# Patient Record
Sex: FEMALE | Race: WHITE | Employment: OTHER | URBAN - METROPOLITAN AREA
[De-identification: names, ages, dates, MRNs, and addresses within clinical notes are randomized per-mention and may not be internally consistent; named-entity substitution may affect disease eponyms.]

---

## 2017-04-06 ENCOUNTER — TELEPHONE (OUTPATIENT)
Dept: INTERNAL MEDICINE | Age: 82
End: 2017-04-06

## 2017-04-26 ENCOUNTER — OFFICE VISIT (OUTPATIENT)
Dept: INTERNAL MEDICINE | Age: 82
End: 2017-04-26

## 2017-04-26 VITALS
HEART RATE: 80 BPM | DIASTOLIC BLOOD PRESSURE: 78 MMHG | WEIGHT: 164 LBS | RESPIRATION RATE: 16 BRPM | SYSTOLIC BLOOD PRESSURE: 128 MMHG | BODY MASS INDEX: 30.99 KG/M2

## 2017-04-26 DIAGNOSIS — E78.00 PURE HYPERCHOLESTEROLEMIA: ICD-10-CM

## 2017-04-26 DIAGNOSIS — R35.0 URINARY FREQUENCY: Primary | ICD-10-CM

## 2017-04-26 DIAGNOSIS — N39.46 MIXED URGE AND STRESS INCONTINENCE: ICD-10-CM

## 2017-04-26 LAB
A/G RATIO: 1.8 (ref 1.1–2.2)
ALBUMIN SERPL-MCNC: 4.4 G/DL (ref 3.4–5)
ALP BLD-CCNC: 99 U/L (ref 40–129)
ALT SERPL-CCNC: 14 U/L (ref 10–40)
ANION GAP SERPL CALCULATED.3IONS-SCNC: 18 MMOL/L (ref 3–16)
AST SERPL-CCNC: 16 U/L (ref 15–37)
BASOPHILS ABSOLUTE: 0 K/UL (ref 0–0.2)
BASOPHILS RELATIVE PERCENT: 0.3 %
BILIRUB SERPL-MCNC: 0.7 MG/DL (ref 0–1)
BILIRUBIN, POC: NORMAL
BLOOD URINE, POC: NORMAL
BUN BLDV-MCNC: 14 MG/DL (ref 7–20)
CALCIUM SERPL-MCNC: 9.4 MG/DL (ref 8.3–10.6)
CHLORIDE BLD-SCNC: 101 MMOL/L (ref 99–110)
CHOLESTEROL, TOTAL: 210 MG/DL (ref 0–199)
CLARITY, POC: NORMAL
CO2: 22 MMOL/L (ref 21–32)
COLOR, POC: YELLOW
CREAT SERPL-MCNC: 0.6 MG/DL (ref 0.6–1.2)
EOSINOPHILS ABSOLUTE: 0.2 K/UL (ref 0–0.6)
EOSINOPHILS RELATIVE PERCENT: 1.9 %
GFR AFRICAN AMERICAN: >60
GFR NON-AFRICAN AMERICAN: >60
GLOBULIN: 2.4 G/DL
GLUCOSE BLD-MCNC: 106 MG/DL (ref 70–99)
GLUCOSE URINE, POC: NORMAL
HCT VFR BLD CALC: 43.6 % (ref 36–48)
HDLC SERPL-MCNC: 47 MG/DL (ref 40–60)
HEMOGLOBIN: 14.7 G/DL (ref 12–16)
KETONES, POC: NORMAL
LDL CHOLESTEROL CALCULATED: 122 MG/DL
LEUKOCYTE EST, POC: NORMAL
LYMPHOCYTES ABSOLUTE: 2.2 K/UL (ref 1–5.1)
LYMPHOCYTES RELATIVE PERCENT: 28 %
MCH RBC QN AUTO: 33.4 PG (ref 26–34)
MCHC RBC AUTO-ENTMCNC: 33.7 G/DL (ref 31–36)
MCV RBC AUTO: 99.2 FL (ref 80–100)
MONOCYTES ABSOLUTE: 0.5 K/UL (ref 0–1.3)
MONOCYTES RELATIVE PERCENT: 6.6 %
NEUTROPHILS ABSOLUTE: 4.9 K/UL (ref 1.7–7.7)
NEUTROPHILS RELATIVE PERCENT: 63.2 %
NITRITE, POC: NORMAL
PDW BLD-RTO: 13.5 % (ref 12.4–15.4)
PH, POC: 6
PLATELET # BLD: 190 K/UL (ref 135–450)
PMV BLD AUTO: 10.6 FL (ref 5–10.5)
POTASSIUM SERPL-SCNC: 4.3 MMOL/L (ref 3.5–5.1)
PROTEIN, POC: NORMAL
RBC # BLD: 4.39 M/UL (ref 4–5.2)
SODIUM BLD-SCNC: 141 MMOL/L (ref 136–145)
SPECIFIC GRAVITY, POC: 1.01
TOTAL PROTEIN: 6.8 G/DL (ref 6.4–8.2)
TRIGL SERPL-MCNC: 205 MG/DL (ref 0–150)
UROBILINOGEN, POC: NORMAL
VLDLC SERPL CALC-MCNC: 41 MG/DL
WBC # BLD: 7.8 K/UL (ref 4–11)

## 2017-04-26 PROCEDURE — 99214 OFFICE O/P EST MOD 30 MIN: CPT | Performed by: INTERNAL MEDICINE

## 2017-04-26 PROCEDURE — 36415 COLL VENOUS BLD VENIPUNCTURE: CPT | Performed by: INTERNAL MEDICINE

## 2017-04-26 PROCEDURE — 81002 URINALYSIS NONAUTO W/O SCOPE: CPT | Performed by: INTERNAL MEDICINE

## 2017-04-26 RX ORDER — OXYBUTYNIN CHLORIDE 5 MG/1
5 TABLET, EXTENDED RELEASE ORAL DAILY
Qty: 30 TABLET | Refills: 3 | Status: SHIPPED | OUTPATIENT
Start: 2017-04-26 | End: 2017-07-24 | Stop reason: SDUPTHER

## 2017-04-26 ASSESSMENT — PATIENT HEALTH QUESTIONNAIRE - PHQ9
SUM OF ALL RESPONSES TO PHQ QUESTIONS 1-9: 0
1. LITTLE INTEREST OR PLEASURE IN DOING THINGS: 0
SUM OF ALL RESPONSES TO PHQ9 QUESTIONS 1 & 2: 0
2. FEELING DOWN, DEPRESSED OR HOPELESS: 0

## 2017-04-26 ASSESSMENT — ENCOUNTER SYMPTOMS
SHORTNESS OF BREATH: 0
GASTROINTESTINAL NEGATIVE: 1

## 2017-04-27 LAB — TSH SERPL DL<=0.05 MIU/L-ACNC: 2.03 UIU/ML (ref 0.27–4.2)

## 2017-06-28 ENCOUNTER — HOSPITAL ENCOUNTER (OUTPATIENT)
Dept: MAMMOGRAPHY | Age: 82
Discharge: HOME | End: 2017-06-28
Attending: INTERNAL MEDICINE | Admitting: INTERNAL MEDICINE

## 2017-06-28 DIAGNOSIS — Z12.31 VISIT FOR SCREENING MAMMOGRAM: ICD-10-CM

## 2017-07-25 RX ORDER — OXYBUTYNIN CHLORIDE 5 MG/1
TABLET, EXTENDED RELEASE ORAL
Qty: 90 TABLET | Refills: 0 | Status: SHIPPED | OUTPATIENT
Start: 2017-07-25 | End: 2017-12-15 | Stop reason: SDUPTHER

## 2017-08-16 ENCOUNTER — TELEPHONE (OUTPATIENT)
Dept: DERMATOLOGY | Age: 82
End: 2017-08-16

## 2017-08-16 ENCOUNTER — OFFICE VISIT (OUTPATIENT)
Dept: INTERNAL MEDICINE | Age: 82
End: 2017-08-16

## 2017-08-16 VITALS — DIASTOLIC BLOOD PRESSURE: 76 MMHG | BODY MASS INDEX: 31.55 KG/M2 | WEIGHT: 167 LBS | SYSTOLIC BLOOD PRESSURE: 122 MMHG

## 2017-08-16 DIAGNOSIS — L57.0 ACTINIC KERATOSIS: ICD-10-CM

## 2017-08-16 DIAGNOSIS — E78.2 MIXED DYSLIPIDEMIA: ICD-10-CM

## 2017-08-16 DIAGNOSIS — N32.81 OVERACTIVE BLADDER: Primary | ICD-10-CM

## 2017-08-16 DIAGNOSIS — M81.6 LOCALIZED OSTEOPOROSIS WITHOUT CURRENT PATHOLOGICAL FRACTURE: ICD-10-CM

## 2017-08-16 DIAGNOSIS — R73.9 HYPERGLYCEMIA: ICD-10-CM

## 2017-08-16 PROCEDURE — 99214 OFFICE O/P EST MOD 30 MIN: CPT | Performed by: HOSPITALIST

## 2017-08-16 ASSESSMENT — PATIENT HEALTH QUESTIONNAIRE - PHQ9
1. LITTLE INTEREST OR PLEASURE IN DOING THINGS: 0
2. FEELING DOWN, DEPRESSED OR HOPELESS: 0
SUM OF ALL RESPONSES TO PHQ QUESTIONS 1-9: 0
SUM OF ALL RESPONSES TO PHQ9 QUESTIONS 1 & 2: 0

## 2017-08-16 ASSESSMENT — ENCOUNTER SYMPTOMS
SPUTUM PRODUCTION: 1
GASTROINTESTINAL NEGATIVE: 1
EYES NEGATIVE: 1

## 2017-08-21 ENCOUNTER — HOSPITAL ENCOUNTER (OUTPATIENT)
Dept: ENDOSCOPY | Age: 82
Discharge: HOME | End: 2017-08-21
Attending: INTERNAL MEDICINE | Admitting: INTERNAL MEDICINE

## 2017-08-21 VITALS
BODY MASS INDEX: 30.73 KG/M2 | SYSTOLIC BLOOD PRESSURE: 151 MMHG | WEIGHT: 167 LBS | HEIGHT: 62 IN | DIASTOLIC BLOOD PRESSURE: 82 MMHG | OXYGEN SATURATION: 98 % | TEMPERATURE: 97.8 F | HEART RATE: 76 BPM | RESPIRATION RATE: 20 BRPM

## 2017-08-30 ENCOUNTER — OFFICE VISIT (OUTPATIENT)
Dept: DERMATOLOGY | Age: 82
End: 2017-08-30

## 2017-08-30 DIAGNOSIS — Z12.83 SCREENING EXAM FOR SKIN CANCER: ICD-10-CM

## 2017-08-30 DIAGNOSIS — L82.1 SEBORRHEIC KERATOSES: Primary | ICD-10-CM

## 2017-08-30 PROCEDURE — 99201 PR OFFICE OUTPATIENT NEW 10 MINUTES: CPT | Performed by: DERMATOLOGY

## 2017-10-11 ENCOUNTER — TELEPHONE (OUTPATIENT)
Dept: INTERNAL MEDICINE | Age: 82
End: 2017-10-11

## 2017-10-11 DIAGNOSIS — R29.898 LEFT LEG WEAKNESS: Primary | ICD-10-CM

## 2017-10-12 NOTE — TELEPHONE ENCOUNTER
Okay to order Physical Therapy per Dr. Robby Nguyen message for patient to call back and let us know where we should order this

## 2017-11-21 ENCOUNTER — NURSE ONLY (OUTPATIENT)
Dept: INTERNAL MEDICINE | Age: 82
End: 2017-11-21

## 2017-11-21 DIAGNOSIS — Z23 NEED FOR INFLUENZA VACCINATION: Primary | ICD-10-CM

## 2017-11-21 PROCEDURE — 90662 IIV NO PRSV INCREASED AG IM: CPT | Performed by: HOSPITALIST

## 2017-11-21 PROCEDURE — 90471 IMMUNIZATION ADMIN: CPT | Performed by: HOSPITALIST

## 2017-11-30 RX ORDER — ERGOCALCIFEROL 1.25 MG/1
CAPSULE ORAL
Qty: 12 CAPSULE | Refills: 3 | Status: SHIPPED | OUTPATIENT
Start: 2017-11-30 | End: 2019-06-06 | Stop reason: SDUPTHER

## 2017-12-15 RX ORDER — OXYBUTYNIN CHLORIDE 5 MG/1
TABLET, EXTENDED RELEASE ORAL
Qty: 90 TABLET | Refills: 0 | Status: SHIPPED | OUTPATIENT
Start: 2017-12-15 | End: 2018-03-14 | Stop reason: DRUGHIGH

## 2018-02-22 ENCOUNTER — TELEPHONE (OUTPATIENT)
Dept: INTERNAL MEDICINE | Age: 83
End: 2018-02-22

## 2018-02-22 RX ORDER — RISEDRONATE SODIUM 35 MG/1
TABLET, FILM COATED ORAL
Qty: 12 TABLET | Refills: 0 | Status: SHIPPED | OUTPATIENT
Start: 2018-02-22 | End: 2018-05-30 | Stop reason: SDUPTHER

## 2018-03-14 ENCOUNTER — OFFICE VISIT (OUTPATIENT)
Dept: INTERNAL MEDICINE | Age: 83
End: 2018-03-14

## 2018-03-14 VITALS
HEIGHT: 62 IN | WEIGHT: 161 LBS | DIASTOLIC BLOOD PRESSURE: 80 MMHG | SYSTOLIC BLOOD PRESSURE: 110 MMHG | BODY MASS INDEX: 29.63 KG/M2

## 2018-03-14 DIAGNOSIS — Z23 NEED FOR SHINGLES VACCINE: ICD-10-CM

## 2018-03-14 DIAGNOSIS — N39.41 URGE INCONTINENCE: Primary | ICD-10-CM

## 2018-03-14 DIAGNOSIS — J30.89 CHRONIC NON-SEASONAL ALLERGIC RHINITIS, UNSPECIFIED TRIGGER: ICD-10-CM

## 2018-03-14 DIAGNOSIS — M81.6 LOCALIZED OSTEOPOROSIS WITHOUT CURRENT PATHOLOGICAL FRACTURE: ICD-10-CM

## 2018-03-14 DIAGNOSIS — L84 CALLUS OF FOOT: ICD-10-CM

## 2018-03-14 LAB
A/G RATIO: 1.7 (ref 1.1–2.2)
ALBUMIN SERPL-MCNC: 4.3 G/DL (ref 3.4–5)
ALP BLD-CCNC: 91 U/L (ref 40–129)
ALT SERPL-CCNC: 15 U/L (ref 10–40)
ANION GAP SERPL CALCULATED.3IONS-SCNC: 14 MMOL/L (ref 3–16)
AST SERPL-CCNC: 21 U/L (ref 15–37)
BASOPHILS ABSOLUTE: 0 K/UL (ref 0–0.2)
BASOPHILS RELATIVE PERCENT: 0.5 %
BILIRUB SERPL-MCNC: 0.6 MG/DL (ref 0–1)
BUN BLDV-MCNC: 17 MG/DL (ref 7–20)
CALCIUM SERPL-MCNC: 9.1 MG/DL (ref 8.3–10.6)
CHLORIDE BLD-SCNC: 104 MMOL/L (ref 99–110)
CHOLESTEROL, TOTAL: 187 MG/DL (ref 0–199)
CO2: 24 MMOL/L (ref 21–32)
CREAT SERPL-MCNC: 0.8 MG/DL (ref 0.6–1.2)
EOSINOPHILS ABSOLUTE: 0.1 K/UL (ref 0–0.6)
EOSINOPHILS RELATIVE PERCENT: 1.1 %
GFR AFRICAN AMERICAN: >60
GFR NON-AFRICAN AMERICAN: >60
GLOBULIN: 2.5 G/DL
GLUCOSE BLD-MCNC: 104 MG/DL (ref 70–99)
HCT VFR BLD CALC: 42.9 % (ref 36–48)
HDLC SERPL-MCNC: 52 MG/DL (ref 40–60)
HEMOGLOBIN: 14.8 G/DL (ref 12–16)
LDL CHOLESTEROL CALCULATED: 107 MG/DL
LYMPHOCYTES ABSOLUTE: 2 K/UL (ref 1–5.1)
LYMPHOCYTES RELATIVE PERCENT: 23 %
MCH RBC QN AUTO: 34 PG (ref 26–34)
MCHC RBC AUTO-ENTMCNC: 34.6 G/DL (ref 31–36)
MCV RBC AUTO: 98.3 FL (ref 80–100)
MONOCYTES ABSOLUTE: 0.6 K/UL (ref 0–1.3)
MONOCYTES RELATIVE PERCENT: 7.6 %
NEUTROPHILS ABSOLUTE: 5.8 K/UL (ref 1.7–7.7)
NEUTROPHILS RELATIVE PERCENT: 67.8 %
PDW BLD-RTO: 13.6 % (ref 12.4–15.4)
PLATELET # BLD: 180 K/UL (ref 135–450)
PMV BLD AUTO: 10.3 FL (ref 5–10.5)
POTASSIUM SERPL-SCNC: 4.6 MMOL/L (ref 3.5–5.1)
RBC # BLD: 4.36 M/UL (ref 4–5.2)
SODIUM BLD-SCNC: 142 MMOL/L (ref 136–145)
TOTAL PROTEIN: 6.8 G/DL (ref 6.4–8.2)
TRIGL SERPL-MCNC: 142 MG/DL (ref 0–150)
VLDLC SERPL CALC-MCNC: 28 MG/DL
WBC # BLD: 8.5 K/UL (ref 4–11)

## 2018-03-14 PROCEDURE — 99214 OFFICE O/P EST MOD 30 MIN: CPT | Performed by: HOSPITALIST

## 2018-03-14 RX ORDER — OXYBUTYNIN CHLORIDE 10 MG/1
TABLET, EXTENDED RELEASE ORAL
Qty: 30 TABLET | Refills: 3 | Status: SHIPPED | OUTPATIENT
Start: 2018-03-14 | End: 2018-03-20 | Stop reason: SDUPTHER

## 2018-03-14 ASSESSMENT — PATIENT HEALTH QUESTIONNAIRE - PHQ9
1. LITTLE INTEREST OR PLEASURE IN DOING THINGS: 0
SUM OF ALL RESPONSES TO PHQ QUESTIONS 1-9: 0
2. FEELING DOWN, DEPRESSED OR HOPELESS: 0
SUM OF ALL RESPONSES TO PHQ9 QUESTIONS 1 & 2: 0

## 2018-03-20 DIAGNOSIS — N39.41 URGE INCONTINENCE: ICD-10-CM

## 2018-03-20 RX ORDER — OXYBUTYNIN CHLORIDE 10 MG/1
TABLET, EXTENDED RELEASE ORAL
Qty: 90 TABLET | Refills: 3 | Status: SHIPPED | OUTPATIENT
Start: 2018-03-20 | End: 2019-04-11 | Stop reason: SDUPTHER

## 2018-05-30 RX ORDER — RISEDRONATE SODIUM 35 MG/1
TABLET, FILM COATED ORAL
Qty: 12 TABLET | Refills: 0 | Status: SHIPPED | OUTPATIENT
Start: 2018-05-30 | End: 2018-08-20 | Stop reason: SDUPTHER

## 2018-07-02 ENCOUNTER — OFFICE VISIT (OUTPATIENT)
Dept: INTERNAL MEDICINE | Age: 83
End: 2018-07-02

## 2018-07-02 VITALS
WEIGHT: 163 LBS | HEIGHT: 62 IN | DIASTOLIC BLOOD PRESSURE: 84 MMHG | SYSTOLIC BLOOD PRESSURE: 130 MMHG | BODY MASS INDEX: 30 KG/M2

## 2018-07-02 DIAGNOSIS — R53.81 PHYSICAL DECONDITIONING: ICD-10-CM

## 2018-07-02 DIAGNOSIS — N39.41 URGE INCONTINENCE OF URINE: ICD-10-CM

## 2018-07-02 DIAGNOSIS — L84 CALLUS OF FOOT: ICD-10-CM

## 2018-07-02 DIAGNOSIS — G31.84 MILD COGNITIVE IMPAIRMENT: ICD-10-CM

## 2018-07-02 DIAGNOSIS — Z00.00 ROUTINE GENERAL MEDICAL EXAMINATION AT A HEALTH CARE FACILITY: Primary | ICD-10-CM

## 2018-07-02 DIAGNOSIS — J30.1 CHRONIC SEASONAL ALLERGIC RHINITIS DUE TO POLLEN: ICD-10-CM

## 2018-07-02 PROCEDURE — G0439 PPPS, SUBSEQ VISIT: HCPCS | Performed by: HOSPITALIST

## 2018-07-02 PROCEDURE — 99214 OFFICE O/P EST MOD 30 MIN: CPT | Performed by: HOSPITALIST

## 2018-07-02 ASSESSMENT — PATIENT HEALTH QUESTIONNAIRE - PHQ9: SUM OF ALL RESPONSES TO PHQ QUESTIONS 1-9: 0

## 2018-07-02 ASSESSMENT — ANXIETY QUESTIONNAIRES: GAD7 TOTAL SCORE: 0

## 2018-07-02 NOTE — PROGRESS NOTES
Medicare Annual Wellness Visit - Subsequent    Name: Magalie Meraz Date: 2018   MRN: [id number] Sex: Female   Age: 80 y.o. Ethnicity: Non-/Non    : 1934 Race: Caren Bravo is here for   Chief Complaint   Patient presents with   [address] for behavioral, psychosocial and functional/safety risks, and cognitive dysfunction are all negative except as indicated below. These results, as well as other patient data from the [address] form, are documented in Flowsheets linked to this Encounter. No Known Allergies    Prior to Visit Medications    Medication Sig Taking? Authorizing Provider   risedronate (ACTONEL) 35 MG tablet TAKE 1 TABLET BY MOUTH EVERY 7 DAYS IN THE MORNING WITH A FULL GLASS OF WATER AND ON AN EMPTY STOMACH. DO NOT EAT OR LIE DOWN FOR 30 MINUTES Yes Loren Lopez MD   oxybutynin (DITROPAN-XL) 10 MG extended release tablet TAKE 1 TABLET BY MOUTH DAILY Yes Loren Lopez MD   vitamin D (ERGOCALCIFEROL) 62863 units CAPS capsule TAKE 1 CAPSULE BY MOUTH ONCE A WEEK Yes Loren Lopez MD   ibuprofen (ADVIL;MOTRIN) 800 MG tablet Take 1 tablet by mouth every 8 hours as needed for Pain.  Yes Brando Nelson MD       Past Medical History:   Diagnosis Date    Acute mastoiditis without complications     Benign neoplasm of colon     Diverticulosis of colon (without mention of hemorrhage)     Internal hemorrhoids without mention of complication     Osteoporosis, unspecified     Other seborrheic keratosis     Pure hypercholesterolemia     Routine general medical examination at a health care facility        Past Surgical History:   Procedure Laterality Date    CATARACT REMOVAL      Bilateral    COLONOSCOPY   , 2006     Dr. Edmundo Kay - internal hemorrhoids , diverticulosis , villotubular adenoma  with  dysplasia     COLONOSCOPY  12    , internal hemorrhoids, diverticulosis, colon polyp, benign, repeat in 5 years    COLONOSCOPY  08/28/2017    Hemmorhoids, diverticulosis and adenamatous colon polyps       Family History   Problem Relation Age of Onset    Cancer Father        CareTeam (Including outside providers/suppliers regularly involved in providing care):   Patient Care Team:  Briana Agudelo MD as PCP - General (Internal Medicine)  Briana Agudelo MD as PCP - S Attributed Provider   Eber Lua MD ( gastroenterology)    Wt Readings from Last 3 Encounters:   07/02/18 163 lb (73.9 kg)   03/14/18 161 lb (73 kg)   08/21/17 167 lb (75.8 kg)     Vitals:    07/02/18 1012   BP: 130/84   Weight: 163 lb (73.9 kg)   Height: 5' 2\" (1.575 m)       General Appearance: alert and oriented to person, place and time, well developed and well- nourished, in no acute distress  Skin: warm and dry, no erythema. + multiple lesions of seborrheic dermatitis on her back. Head: normocephalic and atraumatic  Eyes: pupils equal, round, and reactive to light, extraocular eye movements intact, conjunctivae normal  ENT: tympanic membrane, external ear and ear canal normal bilaterally, nose without deformity, nasal mucosa and turbinates normal without polyps  Neck: supple and non-tender without mass, no thyromegaly or thyroid nodules, no cervical lymphadenopathy  Pulmonary/Chest: clear to auscultation bilaterally- no wheezes, rales or rhonchi, normal air movement, no respiratory distress  Cardiovascular: normal rate, regular rhythm, normal S1 and S2, no murmurs, rubs, clicks, or gallops, distal pulses intact, no carotid bruits  Abdomen: soft, non-tender, non-distended, normal bowel sounds, no masses or organomegaly  Extremities: no cyanosis, clubbing or edema. + multiple calluses of both feet.   Musculoskeletal: normal range of motion, no joint swelling, deformity or tenderness  Neurologic: reflexes normal and symmetric, no cranial nerve deficit, gait, coordination and speech normal    The following problems were reviewed today

## 2018-07-02 NOTE — PATIENT INSTRUCTIONS
Personalized Preventive Plan for Seth Rojo - 7/2/2018  Medicare offers a range of preventive health benefits. Some of the tests and screenings are paid in full while other may be subject to a deductible, co-insurance, and/or copay. Some of these benefits include a comprehensive review of your medical history including lifestyle, illnesses that may run in your family, and various assessments and screenings as appropriate. After reviewing your medical record and screening and assessments performed today your provider may have ordered immunizations, labs, imaging, and/or referrals for you. A list of these orders (if applicable) as well as your Preventive Care list are included within your After Visit Summary for your review. Other Preventive Recommendations:    · A preventive eye exam performed by an eye specialist is recommended every 1-2 years to screen for glaucoma; cataracts, macular degeneration, and other eye disorders. · A preventive dental visit is recommended every 6 months. · Try to get at least 150 minutes of exercise per week or 10,000 steps per day on a pedometer . · Order or download the FREE \"Exercise & Physical Activity: Your Everyday Guide\" from [employer]. Call 2-817.208.8397 or search [employer] online. · You need 9459-1372 mg of calcium and 8424-0333 IU of vitamin D per day. It is possible to meet your calcium requirement with diet alone, but a vitamin D supplement is usually necessary to meet this goal.  · When exposed to the sun, use a sunscreen that protects against both UVA and UVB radiation with an SPF of 30 or greater. Reapply every 2 to 3 hours or after sweating, drying off with a towel, or swimming. · Always wear a seat belt when traveling in a car. Always wear a helmet when riding a bicycle or motorcycle.

## 2018-08-21 RX ORDER — RISEDRONATE SODIUM 35 MG/1
TABLET, FILM COATED ORAL
Qty: 12 TABLET | Refills: 0 | Status: SHIPPED | OUTPATIENT
Start: 2018-08-21 | End: 2019-04-03 | Stop reason: SDUPTHER

## 2018-08-27 ENCOUNTER — HOSPITAL ENCOUNTER (OUTPATIENT)
Dept: MAMMOGRAPHY | Age: 83
Discharge: HOME | End: 2018-08-27
Payer: MEDICARE

## 2018-08-27 DIAGNOSIS — Z12.31 VISIT FOR SCREENING MAMMOGRAM: ICD-10-CM

## 2018-08-27 PROCEDURE — 77067 SCR MAMMO BI INCL CAD: CPT

## 2019-01-02 ENCOUNTER — OFFICE VISIT (OUTPATIENT)
Dept: INTERNAL MEDICINE CLINIC | Age: 84
End: 2019-01-02
Payer: MEDICARE

## 2019-01-02 VITALS
BODY MASS INDEX: 29.44 KG/M2 | WEIGHT: 160 LBS | HEIGHT: 62 IN | SYSTOLIC BLOOD PRESSURE: 120 MMHG | DIASTOLIC BLOOD PRESSURE: 82 MMHG

## 2019-01-02 DIAGNOSIS — L84 FOOT CALLUS: ICD-10-CM

## 2019-01-02 DIAGNOSIS — M81.6 LOCALIZED OSTEOPOROSIS WITHOUT CURRENT PATHOLOGICAL FRACTURE: ICD-10-CM

## 2019-01-02 DIAGNOSIS — N39.41 URGE INCONTINENCE: ICD-10-CM

## 2019-01-02 DIAGNOSIS — L98.9 SKIN LESION OF RIGHT LEG: ICD-10-CM

## 2019-01-02 DIAGNOSIS — E78.00 PURE HYPERCHOLESTEROLEMIA: Primary | ICD-10-CM

## 2019-01-02 PROCEDURE — 3288F FALL RISK ASSESSMENT DOCD: CPT | Performed by: HOSPITALIST

## 2019-01-02 PROCEDURE — 99214 OFFICE O/P EST MOD 30 MIN: CPT | Performed by: HOSPITALIST

## 2019-01-02 PROCEDURE — G8510 SCR DEP NEG, NO PLAN REQD: HCPCS | Performed by: HOSPITALIST

## 2019-01-02 ASSESSMENT — PATIENT HEALTH QUESTIONNAIRE - PHQ9
SUM OF ALL RESPONSES TO PHQ9 QUESTIONS 1 & 2: 0
SUM OF ALL RESPONSES TO PHQ QUESTIONS 1-9: 0
SUM OF ALL RESPONSES TO PHQ QUESTIONS 1-9: 0
2. FEELING DOWN, DEPRESSED OR HOPELESS: 0
1. LITTLE INTEREST OR PLEASURE IN DOING THINGS: 0

## 2019-04-04 RX ORDER — RISEDRONATE SODIUM 35 MG/1
TABLET, FILM COATED ORAL
Qty: 12 TABLET | Refills: 1 | Status: SHIPPED | OUTPATIENT
Start: 2019-04-04 | End: 2019-09-28 | Stop reason: SDUPTHER

## 2019-04-11 DIAGNOSIS — N39.41 URGE INCONTINENCE: ICD-10-CM

## 2019-04-11 RX ORDER — OXYBUTYNIN CHLORIDE 10 MG/1
TABLET, EXTENDED RELEASE ORAL
Qty: 90 TABLET | Refills: 3 | Status: SHIPPED | OUTPATIENT
Start: 2019-04-11 | End: 2020-09-16

## 2019-05-30 RX ORDER — ERGOCALCIFEROL 1.25 MG/1
CAPSULE ORAL
Qty: 12 CAPSULE | Refills: 0 | OUTPATIENT
Start: 2019-05-30

## 2019-06-05 NOTE — TELEPHONE ENCOUNTER
vitamin D (ERGOCALCIFEROL) 05497 units CAPS capsule- requesting refill pls advise      [facility] [place] [address], [facility] [address] [facility] [place], [place] [phone]

## 2019-06-06 RX ORDER — ERGOCALCIFEROL 1.25 MG/1
CAPSULE ORAL
Qty: 12 CAPSULE | Refills: 3 | Status: SHIPPED | OUTPATIENT
Start: 2019-06-06 | End: 2020-01-07 | Stop reason: SDUPTHER

## 2019-07-03 ENCOUNTER — OFFICE VISIT (OUTPATIENT)
Dept: INTERNAL MEDICINE CLINIC | Age: 84
End: 2019-07-03
Payer: MEDICARE

## 2019-07-03 VITALS
HEIGHT: 62 IN | SYSTOLIC BLOOD PRESSURE: 132 MMHG | DIASTOLIC BLOOD PRESSURE: 84 MMHG | WEIGHT: 157 LBS | BODY MASS INDEX: 28.89 KG/M2

## 2019-07-03 DIAGNOSIS — L82.1 OTHER SEBORRHEIC KERATOSIS: ICD-10-CM

## 2019-07-03 DIAGNOSIS — N39.46 MIXED URGE AND STRESS INCONTINENCE: ICD-10-CM

## 2019-07-03 DIAGNOSIS — E78.00 PURE HYPERCHOLESTEROLEMIA: Primary | ICD-10-CM

## 2019-07-03 PROCEDURE — 3288F FALL RISK ASSESSMENT DOCD: CPT | Performed by: HOSPITALIST

## 2019-07-03 PROCEDURE — G8510 SCR DEP NEG, NO PLAN REQD: HCPCS | Performed by: HOSPITALIST

## 2019-07-03 PROCEDURE — 99214 OFFICE O/P EST MOD 30 MIN: CPT | Performed by: HOSPITALIST

## 2019-07-03 ASSESSMENT — PATIENT HEALTH QUESTIONNAIRE - PHQ9
SUM OF ALL RESPONSES TO PHQ QUESTIONS 1-9: 0
SUM OF ALL RESPONSES TO PHQ QUESTIONS 1-9: 0
1. LITTLE INTEREST OR PLEASURE IN DOING THINGS: 0
SUM OF ALL RESPONSES TO PHQ9 QUESTIONS 1 & 2: 0
2. FEELING DOWN, DEPRESSED OR HOPELESS: 0

## 2019-07-03 NOTE — PROGRESS NOTES
MORNING WITH A FULL GLASS OF WATER AND ON AN EMPTY STOMACH. DO NOT EAT OR LIE DOWN FOR 30 MINUTES 12 tablet 1    ibuprofen (ADVIL;MOTRIN) 800 MG tablet Take 1 tablet by mouth every 8 hours as needed for Pain. No current facility-administered medications for this visit. /84   Ht 5' 2\" (1.575 m)   Wt 157 lb (71.2 kg)   BMI 28.72 kg/m²     Physical Exam   Physical Exam   Constitutional: She is oriented to person, place, and time. She appears well-developed and well-nourished. No distress. HENT:   Head: Normocephalic and atraumatic. Right Ear: External ear normal.   Left Ear: External ear normal.   Mouth/Throat: Oropharynx is clear and moist.   Seborrheic Keratosis behind ears and of neck   Eyes: Pupils are equal, round, and reactive to light. EOM are normal.   Cardiovascular: Normal rate, regular rhythm and normal heart sounds. Exam reveals no friction rub. No murmur heard. Pulmonary/Chest: Effort normal. She has no wheezes. She has rales (mild). Abdominal: Soft. Bowel sounds are normal. She exhibits no distension. There is no tenderness. Musculoskeletal: Normal range of motion. She exhibits no edema. Neurological: She is alert and oriented to person, place, and time. Skin:   Multiple lesions of varying size of Seborrheic keratosis of neck and back       Assessment/ plan:     Adam Alexander was seen today for 6 month follow-up. Diagnoses and all orders for this visit:    Pure hypercholesterolemia  -     CBC Auto Differential; Future  -     Comprehensive Metabolic Panel; Future  -     Lipid Panel;  Future  -     Low fat/ low cholesterol diet    Other seborrheic keratosis        -     Doesn't want to follow up with a dermatologist        -     Will monitor    Mixed urge and stress incontinence        -    Continue Oxybutinin     Follow up in 6 months    Raphael Richardson MD

## 2019-07-08 DIAGNOSIS — E78.00 PURE HYPERCHOLESTEROLEMIA: ICD-10-CM

## 2019-07-08 LAB
A/G RATIO: 1.6 (ref 1.1–2.2)
ALBUMIN SERPL-MCNC: 4.1 G/DL (ref 3.4–5)
ALP BLD-CCNC: 89 U/L (ref 40–129)
ALT SERPL-CCNC: 12 U/L (ref 10–40)
ANION GAP SERPL CALCULATED.3IONS-SCNC: 14 MMOL/L (ref 3–16)
AST SERPL-CCNC: 18 U/L (ref 15–37)
BASOPHILS ABSOLUTE: 0 K/UL (ref 0–0.2)
BASOPHILS RELATIVE PERCENT: 0.6 %
BILIRUB SERPL-MCNC: 0.5 MG/DL (ref 0–1)
BUN BLDV-MCNC: 21 MG/DL (ref 7–20)
CALCIUM SERPL-MCNC: 9.7 MG/DL (ref 8.3–10.6)
CHLORIDE BLD-SCNC: 106 MMOL/L (ref 99–110)
CHOLESTEROL, TOTAL: 169 MG/DL (ref 0–199)
CO2: 24 MMOL/L (ref 21–32)
CREAT SERPL-MCNC: 0.7 MG/DL (ref 0.6–1.2)
EOSINOPHILS ABSOLUTE: 0.1 K/UL (ref 0–0.6)
EOSINOPHILS RELATIVE PERCENT: 1.9 %
GFR AFRICAN AMERICAN: >60
GFR NON-AFRICAN AMERICAN: >60
GLOBULIN: 2.5 G/DL
GLUCOSE BLD-MCNC: 92 MG/DL (ref 70–99)
HCT VFR BLD CALC: 41.5 % (ref 36–48)
HDLC SERPL-MCNC: 45 MG/DL (ref 40–60)
HEMOGLOBIN: 14 G/DL (ref 12–16)
LDL CHOLESTEROL CALCULATED: 96 MG/DL
LYMPHOCYTES ABSOLUTE: 1.8 K/UL (ref 1–5.1)
LYMPHOCYTES RELATIVE PERCENT: 28.2 %
MCH RBC QN AUTO: 33.8 PG (ref 26–34)
MCHC RBC AUTO-ENTMCNC: 33.8 G/DL (ref 31–36)
MCV RBC AUTO: 100.1 FL (ref 80–100)
MONOCYTES ABSOLUTE: 0.5 K/UL (ref 0–1.3)
MONOCYTES RELATIVE PERCENT: 7.7 %
NEUTROPHILS ABSOLUTE: 3.9 K/UL (ref 1.7–7.7)
NEUTROPHILS RELATIVE PERCENT: 61.6 %
PDW BLD-RTO: 13.7 % (ref 12.4–15.4)
PLATELET # BLD: 179 K/UL (ref 135–450)
PMV BLD AUTO: 10.6 FL (ref 5–10.5)
POTASSIUM SERPL-SCNC: 4.6 MMOL/L (ref 3.5–5.1)
RBC # BLD: 4.15 M/UL (ref 4–5.2)
SODIUM BLD-SCNC: 144 MMOL/L (ref 136–145)
TOTAL PROTEIN: 6.6 G/DL (ref 6.4–8.2)
TRIGL SERPL-MCNC: 142 MG/DL (ref 0–150)
VLDLC SERPL CALC-MCNC: 28 MG/DL
WBC # BLD: 6.3 K/UL (ref 4–11)

## 2019-09-30 RX ORDER — RISEDRONATE SODIUM 35 MG/1
TABLET, FILM COATED ORAL
Qty: 12 TABLET | Refills: 0 | Status: SHIPPED | OUTPATIENT
Start: 2019-09-30 | End: 2020-01-07 | Stop reason: SDUPTHER

## 2019-12-09 ENCOUNTER — HOSPITAL ENCOUNTER (OUTPATIENT)
Dept: MAMMOGRAPHY | Age: 84
Discharge: HOME | End: 2019-12-09
Payer: MEDICARE

## 2019-12-09 DIAGNOSIS — Z12.31 VISIT FOR SCREENING MAMMOGRAM: ICD-10-CM

## 2019-12-09 PROCEDURE — 77067 SCR MAMMO BI INCL CAD: CPT

## 2019-12-11 ENCOUNTER — HOSPITAL ENCOUNTER (OUTPATIENT)
Dept: ULTRASOUND IMAGING | Age: 84
Discharge: HOME | End: 2019-12-11
Payer: MEDICARE

## 2019-12-11 DIAGNOSIS — R92.8 ABNORMAL MAMMOGRAM: ICD-10-CM

## 2019-12-11 PROCEDURE — 76642 ULTRASOUND BREAST LIMITED: CPT

## 2020-01-07 ENCOUNTER — OFFICE VISIT (OUTPATIENT)
Dept: INTERNAL MEDICINE CLINIC | Age: 85
End: 2020-01-07
Payer: MEDICARE

## 2020-01-07 VITALS
WEIGHT: 161 LBS | SYSTOLIC BLOOD PRESSURE: 120 MMHG | BODY MASS INDEX: 29.63 KG/M2 | HEIGHT: 62 IN | DIASTOLIC BLOOD PRESSURE: 84 MMHG

## 2020-01-07 PROCEDURE — 99214 OFFICE O/P EST MOD 30 MIN: CPT | Performed by: HOSPITALIST

## 2020-01-07 RX ORDER — RISEDRONATE SODIUM 35 MG/1
TABLET, FILM COATED ORAL
Qty: 12 TABLET | Refills: 2 | Status: SHIPPED | OUTPATIENT
Start: 2020-01-07 | End: 2021-07-20 | Stop reason: SDUPTHER

## 2020-01-07 RX ORDER — ERGOCALCIFEROL 1.25 MG/1
CAPSULE ORAL
Qty: 12 CAPSULE | Refills: 3 | Status: SHIPPED | OUTPATIENT
Start: 2020-01-07 | End: 2021-07-20 | Stop reason: SDUPTHER

## 2020-04-13 ENCOUNTER — TELEPHONE (OUTPATIENT)
Dept: INTERNAL MEDICINE CLINIC | Age: 85
End: 2020-04-13

## 2020-04-13 RX ORDER — CIPROFLOXACIN 250 MG/1
250 TABLET, FILM COATED ORAL 2 TIMES DAILY
Qty: 14 TABLET | Refills: 0 | Status: SHIPPED | OUTPATIENT
Start: 2020-04-13 | End: 2020-04-20

## 2020-04-13 NOTE — TELEPHONE ENCOUNTER
Pt states she has burning in bladder. Pt states it just started today. Pt states she has a bladder infection.  Pt would like cipro to be called into pharmacy      Pt  Requesting a call back

## 2020-04-14 LAB — URINE CULTURE, ROUTINE: NORMAL

## 2020-09-16 RX ORDER — OXYBUTYNIN CHLORIDE 10 MG/1
TABLET, EXTENDED RELEASE ORAL
Qty: 90 TABLET | Refills: 0 | Status: SHIPPED | OUTPATIENT
Start: 2020-09-16 | End: 2020-12-14

## 2020-12-14 RX ORDER — OXYBUTYNIN CHLORIDE 10 MG/1
TABLET, EXTENDED RELEASE ORAL
Qty: 90 TABLET | Refills: 0 | Status: SHIPPED | OUTPATIENT
Start: 2020-12-14 | End: 2021-05-07

## 2021-04-22 ENCOUNTER — HOSPITAL ENCOUNTER (OUTPATIENT)
Dept: MAMMOGRAPHY | Age: 86
Discharge: HOME | End: 2021-04-22
Payer: MEDICARE

## 2021-04-22 VITALS — WEIGHT: 161 LBS | HEIGHT: 62 IN | BODY MASS INDEX: 29.63 KG/M2

## 2021-04-22 DIAGNOSIS — Z12.31 VISIT FOR SCREENING MAMMOGRAM: ICD-10-CM

## 2021-04-22 PROCEDURE — 77067 SCR MAMMO BI INCL CAD: CPT

## 2021-05-07 DIAGNOSIS — N39.41 URGE INCONTINENCE: ICD-10-CM

## 2021-05-07 RX ORDER — OXYBUTYNIN CHLORIDE 10 MG/1
TABLET, EXTENDED RELEASE ORAL
Qty: 30 TABLET | Refills: 0 | Status: SHIPPED | OUTPATIENT
Start: 2021-05-07 | End: 2021-06-04

## 2021-06-03 DIAGNOSIS — N39.41 URGE INCONTINENCE: ICD-10-CM

## 2021-06-04 RX ORDER — OXYBUTYNIN CHLORIDE 10 MG/1
TABLET, EXTENDED RELEASE ORAL
Qty: 30 TABLET | Refills: 0 | Status: SHIPPED | OUTPATIENT
Start: 2021-06-04 | End: 2022-07-06 | Stop reason: CLARIF

## 2021-06-24 ENCOUNTER — OFFICE VISIT (OUTPATIENT)
Dept: INTERNAL MEDICINE CLINIC | Age: 86
End: 2021-06-24
Payer: MEDICARE

## 2021-06-24 VITALS
WEIGHT: 157 LBS | HEART RATE: 63 BPM | OXYGEN SATURATION: 96 % | BODY MASS INDEX: 28.72 KG/M2 | DIASTOLIC BLOOD PRESSURE: 72 MMHG | SYSTOLIC BLOOD PRESSURE: 130 MMHG

## 2021-06-24 DIAGNOSIS — E55.9 VITAMIN D DEFICIENCY: ICD-10-CM

## 2021-06-24 DIAGNOSIS — Z13.220 LIPID SCREENING: ICD-10-CM

## 2021-06-24 DIAGNOSIS — R01.1 CARDIAC MURMUR: ICD-10-CM

## 2021-06-24 DIAGNOSIS — B35.3 TINEA PEDIS OF BOTH FEET: ICD-10-CM

## 2021-06-24 DIAGNOSIS — Z00.00 ROUTINE GENERAL MEDICAL EXAMINATION AT A HEALTH CARE FACILITY: Primary | ICD-10-CM

## 2021-06-24 DIAGNOSIS — M81.6 LOCALIZED OSTEOPOROSIS WITHOUT CURRENT PATHOLOGICAL FRACTURE: ICD-10-CM

## 2021-06-24 PROCEDURE — G0439 PPPS, SUBSEQ VISIT: HCPCS | Performed by: HOSPITALIST

## 2021-06-24 SDOH — ECONOMIC STABILITY: FOOD INSECURITY: WITHIN THE PAST 12 MONTHS, THE FOOD YOU BOUGHT JUST DIDN'T LAST AND YOU DIDN'T HAVE MONEY TO GET MORE.: NEVER TRUE

## 2021-06-24 SDOH — ECONOMIC STABILITY: FOOD INSECURITY: WITHIN THE PAST 12 MONTHS, YOU WORRIED THAT YOUR FOOD WOULD RUN OUT BEFORE YOU GOT MONEY TO BUY MORE.: NEVER TRUE

## 2021-06-24 ASSESSMENT — SOCIAL DETERMINANTS OF HEALTH (SDOH): HOW HARD IS IT FOR YOU TO PAY FOR THE VERY BASICS LIKE FOOD, HOUSING, MEDICAL CARE, AND HEATING?: NOT HARD AT ALL

## 2021-06-24 ASSESSMENT — PATIENT HEALTH QUESTIONNAIRE - PHQ9
SUM OF ALL RESPONSES TO PHQ QUESTIONS 1-9: 0
SUM OF ALL RESPONSES TO PHQ QUESTIONS 1-9: 0
SUM OF ALL RESPONSES TO PHQ9 QUESTIONS 1 & 2: 0
2. FEELING DOWN, DEPRESSED OR HOPELESS: 0
1. LITTLE INTEREST OR PLEASURE IN DOING THINGS: 0
SUM OF ALL RESPONSES TO PHQ QUESTIONS 1-9: 0

## 2021-06-24 ASSESSMENT — LIFESTYLE VARIABLES: HOW OFTEN DO YOU HAVE A DRINK CONTAINING ALCOHOL: 0

## 2021-06-24 NOTE — PROGRESS NOTES
09/21/2015    Influenza, High Dose (Fluzone 65 yrs and older) 11/21/2017, 10/02/2018    Influenza, Quadv, IM, PF (6 mo and older Fluzone, Flulaval, Fluarix, and 3 yrs and older Afluria) 10/26/2016    Pneumococcal Conjugate 13-valent ([id number]) 09/21/2015    Pneumococcal Polysaccharide ([id number]) 11/01/2000, 11/15/2011    Td, unspecified formulation 12/07/2005    Tdap (Boostrix, Adacel) 08/28/2012        Health Maintenance   Topic Date Due    Annual Wellness Visit (AWV)  Never done    DTaP/Tdap/Td vaccine (2 - Td or Tdap) 08/28/2022    Flu vaccine  Completed    Shingles Vaccine  Completed    Pneumococcal 65+ years Vaccine  Completed    COVID-19 Vaccine  Completed    Hepatitis A vaccine  Aged Out    Hepatitis B vaccine  Aged Out    Hib vaccine  Aged Out    Meningococcal (ACWY) vaccine  Aged Out     Recommendations for [employer] Due: see orders and patient instructions/AVS.  . Recommended screening schedule for the next 5-10 years is provided to the patient in written form: see Patient Suzanne Chapa was seen today for medicare awv. Diagnoses and all orders for this visit:    Routine general medical examination at a health care facility  -     CBC Auto Differential; Future  -     Comprehensive Metabolic Panel; Future  -     Encouraged regular exercise    Lipid screening  -     Lipid Panel; Future    Vitamin D deficiency  -     Vitamin D 25 Hydroxy;  Future    Cardiac murmur  -     ECHO Complete 2D W Doppler W Color    Tinea pedis of both feet  -     [facility] - Peter Lyles DPM, Podiatry, [place]    Osteoporosis  -     Continue Actonel      Follow up in 6 months    Emre Graves MD

## 2021-07-20 RX ORDER — ERGOCALCIFEROL 1.25 MG/1
CAPSULE ORAL
Qty: 12 CAPSULE | Refills: 3 | Status: SHIPPED | OUTPATIENT
Start: 2021-07-20 | End: 2021-08-17 | Stop reason: SDUPTHER

## 2021-07-20 RX ORDER — RISEDRONATE SODIUM 35 MG/1
TABLET, FILM COATED ORAL
Qty: 12 TABLET | Refills: 2 | Status: SHIPPED | OUTPATIENT
Start: 2021-07-20 | End: 2022-02-17 | Stop reason: SDUPTHER

## 2021-08-16 NOTE — TELEPHONE ENCOUNTER
Patient's  came into office requesting refill for the following medication:      vitamin D (ERGOCALCIFEROL) 1.25 MG (13124 UT) CAPS capsule     Patient's  stated that they never received refill from [facility] so he cancelled the order with them and wants them the medication refilled at the pharmacy below. [facility] [address], [address] [phone]    Pls advise.

## 2021-08-17 RX ORDER — ERGOCALCIFEROL 1.25 MG/1
CAPSULE ORAL
Qty: 12 CAPSULE | Refills: 3 | Status: SHIPPED | OUTPATIENT
Start: 2021-08-17 | End: 2022-07-06 | Stop reason: CLARIF

## 2022-01-04 ENCOUNTER — OFFICE VISIT (OUTPATIENT)
Dept: INTERNAL MEDICINE CLINIC | Age: 87
End: 2022-01-04
Payer: MEDICARE

## 2022-01-04 VITALS
HEIGHT: 62 IN | DIASTOLIC BLOOD PRESSURE: 81 MMHG | BODY MASS INDEX: 28.16 KG/M2 | HEART RATE: 72 BPM | WEIGHT: 153 LBS | SYSTOLIC BLOOD PRESSURE: 140 MMHG

## 2022-01-04 DIAGNOSIS — N39.41 URGE INCONTINENCE: ICD-10-CM

## 2022-01-04 DIAGNOSIS — B35.3 TINEA PEDIS OF BOTH FEET: ICD-10-CM

## 2022-01-04 DIAGNOSIS — R68.89 FORGETFULNESS: ICD-10-CM

## 2022-01-04 DIAGNOSIS — I35.0 MODERATE AORTIC STENOSIS: ICD-10-CM

## 2022-01-04 DIAGNOSIS — E78.5 DYSLIPIDEMIA: ICD-10-CM

## 2022-01-04 DIAGNOSIS — E78.5 DYSLIPIDEMIA: Primary | ICD-10-CM

## 2022-01-04 LAB
A/G RATIO: 1.5 (ref 1.1–2.2)
ALBUMIN SERPL-MCNC: 4.2 G/DL (ref 3.4–5)
ALP BLD-CCNC: 104 U/L (ref 40–129)
ALT SERPL-CCNC: 13 U/L (ref 10–40)
ANION GAP SERPL CALCULATED.3IONS-SCNC: 13 MMOL/L (ref 3–16)
AST SERPL-CCNC: 15 U/L (ref 15–37)
BASOPHILS ABSOLUTE: 0 K/UL (ref 0–0.2)
BASOPHILS RELATIVE PERCENT: 0.4 %
BILIRUB SERPL-MCNC: 0.7 MG/DL (ref 0–1)
BUN BLDV-MCNC: 17 MG/DL (ref 7–20)
CALCIUM SERPL-MCNC: 10 MG/DL (ref 8.3–10.6)
CHLORIDE BLD-SCNC: 103 MMOL/L (ref 99–110)
CHOLESTEROL, TOTAL: 191 MG/DL (ref 0–199)
CO2: 25 MMOL/L (ref 21–32)
CREAT SERPL-MCNC: 0.7 MG/DL (ref 0.6–1.2)
EOSINOPHILS ABSOLUTE: 0.1 K/UL (ref 0–0.6)
EOSINOPHILS RELATIVE PERCENT: 1.2 %
GFR AFRICAN AMERICAN: >60
GFR NON-AFRICAN AMERICAN: >60
GLUCOSE BLD-MCNC: 101 MG/DL (ref 70–99)
HCT VFR BLD CALC: 45.7 % (ref 36–48)
HDLC SERPL-MCNC: 55 MG/DL (ref 40–60)
HEMOGLOBIN: 15.2 G/DL (ref 12–16)
LDL CHOLESTEROL CALCULATED: 109 MG/DL
LYMPHOCYTES ABSOLUTE: 1.9 K/UL (ref 1–5.1)
LYMPHOCYTES RELATIVE PERCENT: 26.7 %
MCH RBC QN AUTO: 33 PG (ref 26–34)
MCHC RBC AUTO-ENTMCNC: 33.2 G/DL (ref 31–36)
MCV RBC AUTO: 99.3 FL (ref 80–100)
MONOCYTES ABSOLUTE: 0.5 K/UL (ref 0–1.3)
MONOCYTES RELATIVE PERCENT: 7.2 %
NEUTROPHILS ABSOLUTE: 4.7 K/UL (ref 1.7–7.7)
NEUTROPHILS RELATIVE PERCENT: 64.5 %
PDW BLD-RTO: 13.4 % (ref 12.4–15.4)
PLATELET # BLD: 172 K/UL (ref 135–450)
PMV BLD AUTO: 10.1 FL (ref 5–10.5)
POTASSIUM SERPL-SCNC: 4.4 MMOL/L (ref 3.5–5.1)
RBC # BLD: 4.6 M/UL (ref 4–5.2)
SODIUM BLD-SCNC: 141 MMOL/L (ref 136–145)
TOTAL PROTEIN: 7 G/DL (ref 6.4–8.2)
TRIGL SERPL-MCNC: 134 MG/DL (ref 0–150)
TSH SERPL DL<=0.05 MIU/L-ACNC: 1.49 UIU/ML (ref 0.27–4.2)
VLDLC SERPL CALC-MCNC: 27 MG/DL
WBC # BLD: 7.2 K/UL (ref 4–11)

## 2022-01-04 PROCEDURE — 99214 OFFICE O/P EST MOD 30 MIN: CPT | Performed by: HOSPITALIST

## 2022-01-04 NOTE — PROGRESS NOTES
Follow Up Visit Established Patient Visit    Patient:  Олег Fernandez                                               : 1934  Age: 80 y.o. MRN: <[id number]>  Date : 2022    Олег Fernandez is a 80 y.o. female who presents for : Follow-up appointment    Chief Complaint   Patient presents with    Hyperlipidemia     Doesn't feel depressed. No recent falls. Uses a wheeled walker for ambulation. Memory is better with use of Prevagen. No CP/SOB. No heart palpitations. No nausea/ vomiting/ no diarrhea. Follows up with Dr Zaki Cordoba for tinea pedis. Has history of urge urinary incontinence; uses pads for incontinence   Feels that Prevagen use improving her memory and reducing her forgetfulness    Had 2d echo on 2021:  - Left ventricle: The cavity size is normal. Wall thickness was increased in a pattern of mild   LVH. Systolic function was normal. The estimated ejection fraction was in the range of 60% to 65%. Wall    motion was normal; there were no regional wall motion abnormalities. The LVOT stroke index is    36ml/m^2. - Aortic valve: Leaflet calcification and thickening. There is moderate to severe stenosis in    setting of low gradients. Low area calculations are largely driven by small LVOT diameter. Mild    regurgitation. The peak systolic velocity is 3m/sec. The mean systolic gradient is 73QL Hg. The    valve area by the velocity-time integral method is 1.1cm^2. The valve area by the peak   velocity    method is 1.0cm^2. - Mitral valve: Mildly calcified annulus.  - Right ventricle: The cavity size is normal. Systolic function was normal. TAPSE: 1.9cm.  - Pulmonary arteries: Systolic pressure could not be accurately estimated. - Inferior vena cava: The vessel was normal in size. The respirophasic diameter changes were in   the    normal range (>= 50%), consistent with normal central venous pressure.       Past Medical History:   Diagnosis Date    Acute mastoiditis without complications     Benign neoplasm of colon     Diverticulosis of colon (without mention of hemorrhage)     Internal hemorrhoids without mention of complication     Osteoporosis, unspecified     Other seborrheic keratosis     Pure hypercholesterolemia     Routine general medical examination at a health care facility        Past Surgical History:   Procedure Laterality Date    CATARACT REMOVAL  2008    Bilateral    COLONOSCOPY  2/201 , 7/2006     Dr. Arsalan Lovell - internal hemorrhoids , diverticulosis , villotubular adenoma  with  dysplasia     COLONOSCOPY  8/26/12    , internal hemorrhoids, diverticulosis, colon polyp, benign, repeat in 5 years    COLONOSCOPY  08/28/2017    Hemmorhoids, diverticulosis and adenamatous colon polyps       Current Outpatient Medications   Medication Sig Dispense Refill    vitamin D (ERGOCALCIFEROL) 1.25 MG (77567 UT) CAPS capsule TAKE 1 CAPSULE BY MOUTH ONCE A WEEK 12 capsule 3    risedronate (ACTONEL) 35 MG tablet Take 1 tab in the morning on Sunday with full glass of water. Must remain upright or sitting for at least 30 minutes afterwards 12 tablet 2    oxybutynin (DITROPAN-XL) 10 MG extended release tablet TAKE 1 TABLET BY MOUTH DAILY FOR 30 DAYS. 30 tablet 0    ibuprofen (ADVIL;MOTRIN) 800 MG tablet Take 800 mg by mouth every 8 hours as needed for Pain        No current facility-administered medications for this visit. BP (!) 140/81   Pulse 72   Ht 5' 2\" (1.575 m)   Wt 153 lb (69.4 kg)   BMI 27.98 kg/m²     Physical Exam   Physical Exam  Vitals and nursing note reviewed. Constitutional:       General: She is not in acute distress. Appearance: Normal appearance. She is well-developed. HENT:      Head: Normocephalic and atraumatic. Right Ear: Tympanic membrane, ear canal and external ear normal. There is no impacted cerumen. Left Ear: Tympanic membrane, ear canal and external ear normal. There is no impacted cerumen. Mouth/Throat:      Mouth: Mucous membranes are moist.      Pharynx: No oropharyngeal exudate or posterior oropharyngeal erythema. Eyes:      General: No scleral icterus. Extraocular Movements: Extraocular movements intact. Pupils: Pupils are equal, round, and reactive to light. Neck:      Vascular: No carotid bruit or JVD. Cardiovascular:      Rate and Rhythm: Normal rate and regular rhythm. Heart sounds: Normal heart sounds. No murmur heard. No friction rub. No gallop. Pulmonary:      Effort: Pulmonary effort is normal. No respiratory distress. Breath sounds: Normal breath sounds. No wheezing or rales. Abdominal:      General: Bowel sounds are normal. There is no distension. Palpations: Abdomen is soft. Tenderness: There is no abdominal tenderness. There is no right CVA tenderness or left CVA tenderness. Musculoskeletal:         General: Normal range of motion. Cervical back: Normal range of motion and neck supple. Right lower leg: Edema present. Left lower leg: Edema present. Comments: 1+ bilateral below the knee lower extremity edema. Lymphadenopathy:      Cervical: No cervical adenopathy. Skin:     General: Skin is warm and dry. Comments: Skin over both feet and ankles somewhat dry and flaky. Signs of mild chronic multiple toenail onychomycosis. Neurological:      Mental Status: She is alert and oriented to person, place, and time. Cranial Nerves: No cranial nerve deficit. Psychiatric:         Mood and Affect: Mood normal.         Assessment/ plan:     Yo Delcid was seen today for hyperlipidemia. Diagnoses and all orders for this visit:    Dyslipidemia  -     Lipid Panel; Future  -     TSH without Reflex; Future  -     Continue with current dietary modifications    Moderate aortic stenosis  -     CBC Auto Differential; Future  -     Comprehensive Metabolic Panel;  Future  -     We will monitor; will repeat echocardiogram in 6-7 months    Tinea pedis of both feet         -    Keep scheduled follow-up appointments with Dr. Bhargavi Garcia, podiatry    Urge incontinence          -    Oxybutynin 10 mg daily          -    Supportive care    Forgetfulness           -     Continue daily use of Prevagen        Return in about 6 months (around 7/4/2022) for dyslipidemia, urinary incontinence, tinea pedis.     Kymberly Castaneda MD

## 2022-02-17 RX ORDER — RISEDRONATE SODIUM 35 MG/1
TABLET, FILM COATED ORAL
Qty: 12 TABLET | Refills: 3 | Status: SHIPPED | OUTPATIENT
Start: 2022-02-17 | End: 2022-07-06 | Stop reason: CLARIF

## 2022-07-06 ENCOUNTER — OFFICE VISIT (OUTPATIENT)
Dept: INTERNAL MEDICINE CLINIC | Age: 87
End: 2022-07-06
Payer: MEDICARE

## 2022-07-06 VITALS
HEIGHT: 62 IN | SYSTOLIC BLOOD PRESSURE: 147 MMHG | WEIGHT: 153 LBS | BODY MASS INDEX: 28.16 KG/M2 | DIASTOLIC BLOOD PRESSURE: 80 MMHG

## 2022-07-06 DIAGNOSIS — E55.9 VITAMIN D DEFICIENCY: ICD-10-CM

## 2022-07-06 DIAGNOSIS — R68.89 FORGETFULNESS: Primary | ICD-10-CM

## 2022-07-06 DIAGNOSIS — E78.5 DYSLIPIDEMIA: ICD-10-CM

## 2022-07-06 DIAGNOSIS — I35.0 MODERATE AORTIC STENOSIS: ICD-10-CM

## 2022-07-06 PROCEDURE — 1123F ACP DISCUSS/DSCN MKR DOCD: CPT | Performed by: HOSPITALIST

## 2022-07-06 PROCEDURE — 99214 OFFICE O/P EST MOD 30 MIN: CPT | Performed by: HOSPITALIST

## 2022-07-06 RX ORDER — DONEPEZIL HYDROCHLORIDE 5 MG/1
5 TABLET, FILM COATED ORAL NIGHTLY
Qty: 30 TABLET | Refills: 0 | Status: SHIPPED | OUTPATIENT
Start: 2022-07-06 | End: 2022-08-05

## 2022-07-06 SDOH — ECONOMIC STABILITY: FOOD INSECURITY: WITHIN THE PAST 12 MONTHS, THE FOOD YOU BOUGHT JUST DIDN'T LAST AND YOU DIDN'T HAVE MONEY TO GET MORE.: NEVER TRUE

## 2022-07-06 SDOH — ECONOMIC STABILITY: FOOD INSECURITY: WITHIN THE PAST 12 MONTHS, YOU WORRIED THAT YOUR FOOD WOULD RUN OUT BEFORE YOU GOT MONEY TO BUY MORE.: NEVER TRUE

## 2022-07-06 ASSESSMENT — PATIENT HEALTH QUESTIONNAIRE - PHQ9
SUM OF ALL RESPONSES TO PHQ QUESTIONS 1-9: 0
1. LITTLE INTEREST OR PLEASURE IN DOING THINGS: 0
SUM OF ALL RESPONSES TO PHQ QUESTIONS 1-9: 0
SUM OF ALL RESPONSES TO PHQ9 QUESTIONS 1 & 2: 0
2. FEELING DOWN, DEPRESSED OR HOPELESS: 0

## 2022-07-06 ASSESSMENT — SOCIAL DETERMINANTS OF HEALTH (SDOH): HOW HARD IS IT FOR YOU TO PAY FOR THE VERY BASICS LIKE FOOD, HOUSING, MEDICAL CARE, AND HEATING?: NOT HARD AT ALL

## 2022-07-06 NOTE — PROGRESS NOTES
Follow Up Visit Established Patient Visit    Patient:  Viktor Nova                                               : 1934  Age: 80 y.o. MRN: [id number]  Date : 2022    Viktor Nova is a 80 y.o. female who presents for : Scheduled follow-up appointment    Chief Complaint   Patient presents with    Hyperlipidemia     Doing well overall. Reports frequent urination. No fever/ chills. No chest pain/shortness of breath. No nausea, no vomiting, no diarrhea. Adheres to low-fat/low-cholesterol diet. Fasting lipid panel from 2022 was consistent with total cholesterol 191, HDL 55, , triglycerides 134. Ambulates with a walker. Takes care of her simple ADLs. No recent falls; doesn't feel depressed. Reports forgetfulness (short-term memory problems).     Past Medical History:   Diagnosis Date    Acute mastoiditis without complications     Benign neoplasm of colon     Diverticulosis of colon (without mention of hemorrhage)     Internal hemorrhoids without mention of complication     Osteoporosis, unspecified     Other seborrheic keratosis     Pure hypercholesterolemia     Routine general medical examination at a health care facility        Past Surgical History:   Procedure Laterality Date    CATARACT REMOVAL      Bilateral    COLONOSCOPY   , 2006     Dr. Harry Garcia - internal hemorrhoids , diverticulosis , villotubular adenoma  with  dysplasia     COLONOSCOPY  12    , internal hemorrhoids, diverticulosis, colon polyp, benign, repeat in 5 years    COLONOSCOPY  2017    Hemmorhoids, diverticulosis and adenamatous colon polyps       Current Outpatient Medications   Medication Sig Dispense Refill    mirabegron (MYRBETRIQ) 25 MG TB24 Take 25 mg by mouth daily      Apoaequorin (PREVAGEN PO) Take by mouth      donepezil (ARICEPT) 5 MG tablet Take 1 tablet by mouth nightly 30 tablet 0    ibuprofen (ADVIL;MOTRIN) 800 MG tablet Take 800 mg by mouth every 8 hours as needed for Pain        No current facility-administered medications for this visit. BP (!) 147/80   Ht 5' 2\" (1.575 m)   Wt 153 lb (69.4 kg)   BMI 27.98 kg/m²     Physical Exam   Physical Exam  Vitals and nursing note reviewed. Constitutional:       General: She is not in acute distress. Appearance: Normal appearance. She is well-developed. She is not ill-appearing. HENT:      Head: Normocephalic and atraumatic. Mouth/Throat:      Mouth: Mucous membranes are moist.      Pharynx: Oropharynx is clear. No oropharyngeal exudate or posterior oropharyngeal erythema. Eyes:      General: No scleral icterus. Extraocular Movements: Extraocular movements intact. Pupils: Pupils are equal, round, and reactive to light. Neck:      Vascular: No carotid bruit or JVD. Cardiovascular:      Rate and Rhythm: Normal rate and regular rhythm. Heart sounds: Murmur (over A valve area) heard. No friction rub. No gallop. Pulmonary:      Effort: Pulmonary effort is normal. No respiratory distress. Breath sounds: Normal breath sounds. No wheezing or rales. Chest:      Chest wall: No tenderness. Abdominal:      General: Bowel sounds are normal. There is no distension. Palpations: Abdomen is soft. Tenderness: There is no abdominal tenderness. There is no right CVA tenderness or left CVA tenderness. Musculoskeletal:         General: No tenderness. Normal range of motion. Cervical back: Normal range of motion and neck supple. Right lower leg: Edema present. Left lower leg: Edema present. Comments: There is mild bilateral leg swelling. Several  Patches of dry skin on the R distal shin. Lymphadenopathy:      Cervical: No cervical adenopathy. Skin:     General: Skin is warm and dry. Findings: No erythema. Neurological:      General: No focal deficit present. Mental Status: She is alert and oriented to person, place, and time. Cranial Nerves: No cranial nerve deficit. Sensory: No sensory deficit. Psychiatric:         Mood and Affect: Mood normal.     Unable to complete clock drawing exercise right way    2d echo on 7/19/2021   Left ventricle: The cavity size is normal. Wall thickness was increased in a pattern of mild   LVH. Systolic function was normal. The estimated ejection fraction was in the range of 60% to 65%. Wall    motion was normal; there were no regional wall motion abnormalities. The LVOT stroke index is    36ml/m^2. - Aortic valve: Leaflet calcification and thickening. There is moderate to severe stenosis in    setting of low gradients. Low area calculations are largely driven by small LVOT diameter. Mild    regurgitation. The peak systolic velocity is 3m/sec. The mean systolic gradient is 93LV Hg. The    valve area by the velocity-time integral method is 1.1cm^2. The valve area by the peak   velocity    method is 1.0cm^2. - Mitral valve: Mildly calcified annulus.  - Right ventricle: The cavity size is normal. Systolic function was normal. TAPSE: 1.9cm.  - Pulmonary arteries: Systolic pressure could not be accurately estimated. - Inferior vena cava: The vessel was normal in size. The respirophasic diameter changes were in   the    normal range (>= 50%), consistent with normal central venous pressure. Assessment/ plan:     Pamela Patton was seen today for hyperlipidemia. 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     Nikhil Rivas MD

## 2022-08-02 DIAGNOSIS — E55.9 VITAMIN D DEFICIENCY: ICD-10-CM

## 2022-08-02 DIAGNOSIS — R68.89 FORGETFULNESS: ICD-10-CM

## 2022-08-02 LAB
A/G RATIO: 1.5 (ref 1.1–2.2)
ALBUMIN SERPL-MCNC: 4 G/DL (ref 3.4–5)
ALP BLD-CCNC: 130 U/L (ref 40–129)
ALT SERPL-CCNC: 8 U/L (ref 10–40)
ANION GAP SERPL CALCULATED.3IONS-SCNC: 14 MMOL/L (ref 3–16)
AST SERPL-CCNC: 12 U/L (ref 15–37)
BILIRUB SERPL-MCNC: 0.8 MG/DL (ref 0–1)
BUN BLDV-MCNC: 13 MG/DL (ref 7–20)
CALCIUM SERPL-MCNC: 9.5 MG/DL (ref 8.3–10.6)
CHLORIDE BLD-SCNC: 104 MMOL/L (ref 99–110)
CO2: 25 MMOL/L (ref 21–32)
CREAT SERPL-MCNC: 0.8 MG/DL (ref 0.6–1.2)
GFR AFRICAN AMERICAN: >60
GFR NON-AFRICAN AMERICAN: >60
GLUCOSE BLD-MCNC: 103 MG/DL (ref 70–99)
POTASSIUM SERPL-SCNC: 4.2 MMOL/L (ref 3.5–5.1)
SODIUM BLD-SCNC: 143 MMOL/L (ref 136–145)
TOTAL PROTEIN: 6.6 G/DL (ref 6.4–8.2)
VITAMIN B-12: 263 PG/ML (ref 211–911)
VITAMIN D 25-HYDROXY: 47.7 NG/ML

## 2022-08-05 DIAGNOSIS — R68.89 FORGETFULNESS: ICD-10-CM

## 2022-08-05 RX ORDER — DONEPEZIL HYDROCHLORIDE 5 MG/1
TABLET, FILM COATED ORAL
Qty: 90 TABLET | Refills: 1 | Status: SHIPPED | OUTPATIENT
Start: 2022-08-05

## 2022-08-19 ENCOUNTER — TELEPHONE (OUTPATIENT)
Dept: INTERNAL MEDICINE CLINIC | Age: 87
End: 2022-08-19

## 2022-09-05 RX ORDER — ERGOCALCIFEROL 1.25 MG/1
CAPSULE ORAL
Qty: 12 CAPSULE | Refills: 1 | Status: SHIPPED | OUTPATIENT
Start: 2022-09-05

## 2023-01-05 ENCOUNTER — OFFICE VISIT (OUTPATIENT)
Dept: INTERNAL MEDICINE CLINIC | Age: 88
End: 2023-01-05
Payer: MEDICARE

## 2023-01-05 VITALS
HEIGHT: 62 IN | HEART RATE: 67 BPM | WEIGHT: 146 LBS | SYSTOLIC BLOOD PRESSURE: 119 MMHG | DIASTOLIC BLOOD PRESSURE: 61 MMHG | BODY MASS INDEX: 26.87 KG/M2

## 2023-01-05 DIAGNOSIS — N39.41 URGE INCONTINENCE: ICD-10-CM

## 2023-01-05 DIAGNOSIS — I35.0 MODERATE AORTIC STENOSIS: ICD-10-CM

## 2023-01-05 DIAGNOSIS — R26.81 UNSTEADY GAIT WHEN WALKING: ICD-10-CM

## 2023-01-05 DIAGNOSIS — E78.5 DYSLIPIDEMIA: ICD-10-CM

## 2023-01-05 DIAGNOSIS — R68.89 FORGETFULNESS: ICD-10-CM

## 2023-01-05 DIAGNOSIS — Z00.00 MEDICARE ANNUAL WELLNESS VISIT, SUBSEQUENT: Primary | ICD-10-CM

## 2023-01-05 DIAGNOSIS — B35.3 TINEA PEDIS OF BOTH FEET: ICD-10-CM

## 2023-01-05 PROCEDURE — G0439 PPPS, SUBSEQ VISIT: HCPCS | Performed by: HOSPITALIST

## 2023-01-05 PROCEDURE — 1123F ACP DISCUSS/DSCN MKR DOCD: CPT | Performed by: HOSPITALIST

## 2023-01-05 RX ORDER — LANOLIN ALCOHOL/MO/W.PET/CERES
1000 CREAM (GRAM) TOPICAL DAILY
COMMUNITY

## 2023-01-05 RX ORDER — DONEPEZIL HYDROCHLORIDE 5 MG/1
TABLET, FILM COATED ORAL
Qty: 90 TABLET | Refills: 2 | Status: SHIPPED | OUTPATIENT
Start: 2023-01-05

## 2023-01-05 ASSESSMENT — PATIENT HEALTH QUESTIONNAIRE - PHQ9
SUM OF ALL RESPONSES TO PHQ QUESTIONS 1-9: 0
SUM OF ALL RESPONSES TO PHQ QUESTIONS 1-9: 0
2. FEELING DOWN, DEPRESSED OR HOPELESS: 0
SUM OF ALL RESPONSES TO PHQ9 QUESTIONS 1 & 2: 0
SUM OF ALL RESPONSES TO PHQ QUESTIONS 1-9: 0
1. LITTLE INTEREST OR PLEASURE IN DOING THINGS: 0
SUM OF ALL RESPONSES TO PHQ QUESTIONS 1-9: 0

## 2023-01-05 ASSESSMENT — LIFESTYLE VARIABLES: HOW OFTEN DO YOU HAVE A DRINK CONTAINING ALCOHOL: NEVER

## 2023-01-05 NOTE — PROGRESS NOTES
Medicare Annual Wellness Visit    Heber Figueroa is here for Medicare AWV    Assessment & Plan   Medicare annual wellness visit, subsequent  Forgetfulness  -     donepezil (ARICEPT) 5 MG tablet; TAKE 1 TABLET BY MOUTH EVERY NIGHT, Disp-90 tablet, R-2Normal  Unsteady gait when walking  -     [facility]  Dyslipidemia        -     With low-fat/low-cholesterol diet. Encouraged to increase regular exercise activity. Lipid panel from 1/4/2022 was consistent with total cholesterol 191, HDL 55, , triglycerides 134, VLDL 27    Moderate aortic stenosis         -     2d echo from 7/19/21  - Left ventricle: The cavity size is normal. Wall thickness was increased in a pattern of mild   LVH. Systolic function was normal. The estimated ejection fraction was in the range of 60% to 65%. Wall    motion was normal; there were no regional wall motion abnormalities. The LVOT stroke index is    36ml/m^2. - Aortic valve: Leaflet calcification and thickening. There is moderate to severe stenosis in    setting of low gradients. Low area calculations are largely driven by small LVOT diameter. Mild    regurgitation. The peak systolic velocity is 3m/sec. The mean systolic gradient is 92PS Hg. The    valve area by the velocity-time integral method is 1.1cm^2. The valve area by the peak   velocity    method is 1.0cm^2. - Mitral valve: Mildly calcified annulus.  - Right ventricle: The cavity size is normal. Systolic function was normal. TAPSE: 1.9cm.  - Pulmonary arteries: Systolic pressure could not be accurately estimated. - Inferior vena cava: The vessel was normal in size. The respirophasic diameter changes were in   the    normal range (>= 50%), consistent with normal central venous pressure.     Will arrange a follow-up echocardiogram    Tinea pedis of both feet      Encouraged to make podiatry evaluation and treat    Urge incontinence      Continue Myrbetriq 25 mg daily      Recommendations for Preventive Services Due: see orders and patient instructions/AVS.  Recommended screening schedule for the next 5-10 years is provided to the patient in written form: see Patient Instructions/AVS.     Return for Medicare Annual Wellness Visit in 1 year. Subjective   + unsteady gait. + forgetfulness.  + Generalized fatigue. Requires assistance with performance of some activities of daily living, including showering, cooking, and cleaning her apartment. Does not exercise regularly. Does not report chest pain/shortness of breath. No nausea, no vomiting, no diarrhea. No dysuria. Symptoms of overactive bladder are controlled with use of Myrbetriq 25 mg daily. Takes donepezil 5 mg nightly regularly. Patient's complete Health Risk Assessment and screening values have been reviewed and are found in Flowsheets. The following problems were reviewed today and where indicated follow up appointments were made and/or referrals ordered. Positive Risk Factor Screenings with Interventions:    Fall Risk:  Do you feel unsteady or are you worried about falling? : (!) yes  2 or more falls in past year?: no  Fall with injury in past year?: no     Interventions:    Encouraged to ambulate with a walker. Will order pT for balance training.               Weight and Activity:  Physical Activity: Inactive    Days of Exercise per Week: 0 days    Minutes of Exercise per Session: 0 min     On average, how many days per week do you engage in moderate to strenuous exercise (like a brisk walk)?: 0 days  Have you lost any weight without trying in the past 3 months?: No  Body mass index: (!) 26.7    Inactivity Interventions:  Encouraged to increase regular exercise          ADL's:   Patient reports needing help with:  Select all that apply: (!) Grooming, Bathing, Walking/Balance  Select all that apply: [employer], Housekeeping, Food Preparation, Banking/Finances, Transportation, Taking Medications, Shopping  Interventions:  Requires assistance with showering and house cleaning, and laundry. Will refer for [place] for balance training. Objective   Vitals:    01/05/23 0905   BP: 119/61   Pulse: 67   Weight: 146 lb (66.2 kg)   Height: 5' 2\" (1.575 m)      Body mass index is 26.7 kg/m². General Appearance: alert and oriented to person, place and time, well developed and well- nourished, in no acute distress  Skin: warm and dry, no rash or erythema  Head: normocephalic and atraumatic  Eyes: pupils equal, round, and reactive to light, extraocular eye movements intact, conjunctivae normal  ENT: tympanic membrane, external ear and ear canal normal bilaterally, nose without deformity, nasal mucosa and turbinates normal without polyps  Neck: supple and non-tender without mass, no thyromegaly or thyroid nodules, no cervical lymphadenopathy  Pulmonary/Chest: clear to auscultation bilaterally- no wheezes, rales or rhonchi, normal air movement, no respiratory distress  Cardiovascular: normal rate, regular rhythm, normal S1 and S2, 2-3/6 SUKUMAR of AS, rubs, clicks, or gallops, distal pulses intact, no carotid bruits  Abdomen: soft, non-tender, non-distended, normal bowel sounds, no masses or organomegaly  Extremities: no cyanosis, clubbing or edema. + signs of bilateral tinea pedis and toenail onychomycosis. She has some calluses on the plantar surface of her feet  Musculoskeletal: normal range of motion, no joint swelling, deformity or tenderness  Neurologic: reflexes normal and symmetric, no cranial nerve deficit, gait, coordination and speech normal       No Known Allergies  Prior to Visit Medications    Medication Sig Taking?  Authorizing Provider   vitamin B-12 (CYANOCOBALAMIN) 1000 MCG tablet Take 1,000 mcg by mouth daily Yes Historical Provider, MD   donepezil (ARICEPT) 5 MG tablet TAKE 1 TABLET BY MOUTH EVERY NIGHT Yes Nikhil Rivas MD   mirabegron (MYRBETRIQ) 25 MG TB24 Take 25 mg by mouth daily Yes Historical Provider, MD ibuprofen (ADVIL;MOTRIN) 800 MG tablet Take 800 mg by mouth every 8 hours as needed for Pain  Yes Rolando Egan MD       CareTeam (Including outside providers/suppliers regularly involved in providing care):   Patient Care Team:  Kathy Mo MD as PCP - General (Internal Medicine)  Kathy Mo MD as PCP - [facility] Empaneled Provider     Reviewed and updated this visit:  Tobacco  Allergies  Meds  Med Hx  Surg Hx  Soc Hx  Fam Hx      Follow up in 6 months for forgetfulness, unsteady gait, moderate aortic stenosis, and forgetfulness       Kathy Mo MD

## 2023-01-05 NOTE — PATIENT INSTRUCTIONS
Preventing Falls: Care Instructions  Overview     Getting around your home safely can be a challenge if you have injuries or health problems that make it easy for you to fall. Loose rugs and furniture in walkways are among the dangers for many older people who have problems walking or who have poor eyesight. People who have conditions such as arthritis, osteoporosis, or dementia also have to be careful not to fall. You can make your home safer with a few simple measures. Follow-up care is a key part of your treatment and safety. Be sure to make and go to all appointments, and call your doctor if you are having problems. It's also a good idea to know your test results and keep a list of the medicines you take. How can you care for yourself at home? Taking care of yourself  Exercise regularly to improve your strength, muscle tone, and balance. Walk if you can. Swimming may be a good choice if you cannot walk easily. Have your vision and hearing checked each year or any time you notice a change. If you have trouble seeing and hearing, you might not be able to avoid objects and could lose your balance. Know the side effects of the medicines you take. Ask your doctor or pharmacist whether the medicines you take can affect your balance. Sleeping pills or sedatives can affect your balance. Limit the amount of alcohol you drink. Alcohol can impair your balance and other senses. Ask your doctor whether calluses or corns on your feet need to be removed. If you wear loose-fitting shoes because of calluses or corns, you can lose your balance and fall. Talk to your doctor if you have numbness in your feet. You may get dizzy if you do not drink enough water. To prevent dehydration, drink plenty of fluids. Choose water and other clear liquids. If you have kidney, heart, or liver disease and have to limit fluids, talk with your doctor before you increase the amount of fluids you drink.   Preventing falls at home  Remove raised doorway thresholds, throw rugs, and clutter. Repair loose carpet or raised areas in the floor. Move furniture and electrical cords to keep them out of walking paths. Use nonskid floor wax, and wipe up spills right away, especially on ceramic tile floors. If you use a walker or cane, put rubber tips on it. If you use crutches, clean the bottoms of them regularly with an abrasive pad, such as steel wool. Keep your house well lit, especially stairways, porches, and outside walkways. Use night-lights in areas such as hallways and bathrooms. Add extra light switches or use remote switches (such as switches that go on or off when you clap your hands) to make it easier to turn lights on if you have to get up during the night. Install sturdy handrails on stairways. Move items in your cabinets so that the things you use a lot are on the lower shelves (about waist level). Keep a cordless phone and a flashlight with new batteries by your bed. If possible, put a phone in each of the main rooms of your house, or carry a cell phone in case you fall and cannot reach a phone. Or, you can wear a device around your neck or wrist. You push a button that sends a signal for help. Wear low-heeled shoes that fit well and give your feet good support. Use footwear with nonskid soles. Check the heels and soles of your shoes for wear. Repair or replace worn heels or soles. Do not wear socks without shoes on smooth floors, such as wood. Walk on the grass when the sidewalks are slippery. If you live in an area that gets snow and ice in the winter, sprinkle salt on slippery steps and sidewalks. Or ask a family member or friend to do this for you. Preventing falls in the bath  Install grab bars and nonskid mats inside and outside your shower or tub and near the toilet and sinks. Use shower chairs and bath benches. Use a hand-held shower head that will allow you to sit while showering.   Get into a tub or shower by putting the weaker leg in first. Get out of a tub or shower with your strong side first.  Repair loose toilet seats and consider installing a raised toilet seat to make getting on and off the toilet easier. Keep your bathroom door unlocked while you are in the shower. Where can you learn more? Go to http://www.scherer.com/ and enter G117 to learn more about \"Preventing Falls: Care Instructions. \"  Current as of: May 4, 2022               Content Version: 13.5  © 8926-5799 Healthwise, [employer]. Care instructions adapted under license by [facility] [place] ([facility]). If you have questions about a medical condition or this instruction, always ask your healthcare professional. [address] any warranty or liability for your use of this information. Learning About Being Active as an Older Adult  Why is being active important as you get older? Being active is one of the best things you can do for your health. And it's never too late to start. Being active--or getting active, if you aren't already--has definite benefits. It can:  Give you more energy,  Keep your mind sharp. Improve balance to reduce your risk of falls. Help you manage chronic illness with fewer medicines. No matter how old you are, how fit you are, or what health problems you have, there is a form of activity that will work for you. And the more physical activity you can do, the better your overall health will be. What kinds of activity can help you stay healthy? Being more active will make your daily activities easier. Physical activity includes planned exercise and things you do in daily life. There are four types of activity:  Aerobic. Doing aerobic activity makes your heart and lungs strong. Includes walking, dancing, and gardening. Aim for at least 2½ hours spread throughout the week. It improves your energy and can help you sleep better. Muscle-strengthening.   This type of activity can help maintain muscle and strengthen bones. Includes climbing stairs, using resistance bands, and lifting or carrying heavy loads. Aim for at least twice a week. It can help protect the knees and other joints. Stretching. Stretching gives you better range of motion in joints and muscles. Includes upper arm stretches, calf stretches, and gentle yoga. Aim for at least twice a week, preferably after your muscles are warmed up from other activities. It can help you function better in daily life. Balancing. This helps you stay coordinated and have good posture. Includes heel-to-toe walking, lamont chi, and certain types of yoga. Aim for at least 3 days a week. It can reduce your risk of falling. Even if you have a hard time meeting the recommendations, it's better to be more active than less active. All activity done in each category counts toward your weekly total. You'd be surprised how daily things like carrying groceries, keeping up with grandchildren, and taking the stairs can add up. What keeps you from being active? If you've had a hard time being more active, you're not alone. Maybe you remember being able to do more. Or maybe you've never thought of yourself as being active. It's frustrating when you can't do the things you want. Being more active can help. What's holding you back? Getting started. Have a goal, but break it into easy tasks. Small steps build into big accomplishments. Staying motivated. If you feel like skipping your activity, remember your goal. Maybe you want to move better and stay independent. Every activity gets you one step closer. Not feeling your best.  Start with 5 minutes of an activity you enjoy. Prove to yourself you can do it. As you get comfortable, increase your time. You may not be where you want to be. But you're in the process of getting there. Everyone starts somewhere. How can you find safe ways to stay active?   Talk with your doctor about any physical challenges you're facing. Make a plan with your doctor if you have a health problem or aren't sure how to get started with activity. If you're already active, ask your doctor if there is anything you should change to stay safe as your body and health change. If you tend to feel dizzy after you take medicine, avoid activity at that time. Try being active before you take your medicine. This will reduce your risk of falls. If you plan to be active at home, make sure to clear your space before you get started. Remove things like TV cords, coffee tables, and throw rugs. It's safest to have plenty of space to move freely. The key to getting more active is to take it slow and steady. Try to improve only a little bit at a time. Pick just one area to improve on at first. And if an activity hurts, stop and talk to your doctor. Where can you learn more? Go to http://[employer].scherer.com/ and enter P600 to learn more about \"Learning About Being Active as an Older Adult. \"  Current as of: October 10, 2022               Content Version: 13.5  © 1064-1417 Healthwise, [employer]. Care instructions adapted under license by [facility] ([facility]). If you have questions about a medical condition or this instruction, always ask your healthcare professional. [address] any warranty or liability for your use of this information. Hearing Loss: Care Instructions  Overview     Hearing loss is a sudden or slow decrease in how well you hear. It can range from mild to severe. Permanent hearing loss can occur with aging. It also can happen when you are exposed long-term to loud noise. Examples include listening to loud music, riding motorcycles, or being around other loud machines. Hearing loss can affect your work and home life. It can make you feel lonely or depressed. You may feel that you have lost your independence. But hearing aids and other devices can help you hear better and feel connected to others.   Follow-up care is a key part of your treatment and safety. Be sure to make and go to all appointments, and call your doctor if you are having problems. It's also a good idea to know your test results and keep a list of the medicines you take. How can you care for yourself at home? Avoid loud noises whenever possible. This helps keep your hearing from getting worse. Always wear hearing protection around loud noises. Wear a hearing aid as directed. See a professional who can help you pick a hearing aid that fits you. Have hearing tests as your doctor suggests. They can show whether your hearing has changed. Your hearing aid may need to be adjusted. Use other devices as needed. These may include:  Telephone amplifiers and hearing aids that can connect to a television, stereo, radio, or microphone. Devices that use lights or vibrations. These alert you to the doorbell, a ringing telephone, or a baby monitor. Television closed-captioning. This shows the words at the bottom of the screen. Most new TVs can do this. TTY (text telephone). This lets you type messages back and forth on the telephone instead of talking or listening. These devices are also called TDD. When messages are typed on the keyboard, they are sent over the phone line to a receiving TTY. The message is shown on a monitor. Use text messaging, social media, and email if it is hard for you to communicate by telephone. Try to learn a listening technique called speechreading. It is not lipreading. You pay attention to people's gestures, expressions, posture, and tone of voice. These clues can help you understand what a person is saying. Face the person you are talking to, and have them face you. Make sure the lighting is good. You need to see the other person's face clearly. Think about counseling if you need help to adjust to your hearing loss. When should you call for help?   Watch closely for changes in your health, and be sure to contact your doctor if:    You think your hearing is getting worse.     You have new symptoms, such as dizziness or nausea. Where can you learn more? Go to http://www.scherer.com/ and enter R798 to learn more about \"Hearing Loss: Care Instructions. \"  Current as of: May 4, 2022               Content Version: 13.5  © 2006-2022 [employer]. Care instructions adapted under license by [facility] [place] ([facility]). If you have questions about a medical condition or this instruction, always ask your healthcare professional. [address] any warranty or liability for your use of this information. Learning About Activities of Daily Living  What are activities of daily living? Activities of daily living (ADLs) are the basic self-care tasks you do every day. As you age, and if you have health problems, you may find that it's harder to do these things for yourself. That's when you may need some help. Your doctor uses ADLs to measure how much help you need. Knowing what you can and can't do for yourself is an important first step to getting help. And when you have the help you need, you can stay as independent as possible. Your doctor will want to know if you are able to do tasks such as: Take a bath or shower without help. Go to the bathroom by yourself. Dress and undress without help. Shave, comb your hair, and brush teeth on your own. Get in and out of bed or a chair without help. Feed yourself without help. If you are having trouble doing basic self-care tasks, talk with your doctor. You may want to bring a caregiver or family member who can help the doctor understand your needs and abilities. How will a doctor assess your ADLs? Asking about ADLs is part of a routine health checkup your doctor will likely do as you age.  Your health check might be done in a doctor's office, in your home, or at a hospital. The goal is to find out if you are having any problems that could make your health problems worse or that make it unsafe for you to be on your own. To measure your ADLs, your doctor will ask how hard it is for you to do routine tasks. He or she may also want to know if you have changed the way you do a task because of a health problem. He or she may watch how you:  Walk back and forth. Keep your balance while you stand or walk. Move from sitting to standing or from a bed to a chair. Button or unbutton a shirt or sweater. Remove and put on your shoes. It's normal to feel a little worried or anxious if you find you can't do all the things you used to be able to do. Talking with your doctor about ADLs isn't a test that you either pass or fail. It's just a way to get more information about your health and safety. Follow-up care is a key part of your treatment and safety. Be sure to make and go to all appointments, and call your doctor if you are having problems. It's also a good idea to know your test results and keep a list of the medicines you take. Current as of: October 6, 2021               Content Version: 13.5  © 2006-2022 [employer]. Care instructions adapted under license by [facility] [address] If you have questions about a medical condition or this instruction, always ask your healthcare professional. [place] [address] any warranty or liability for your use of this information. Advance Directives: Care Instructions  Overview  An advance directive is a legal way to state your wishes at the end of your life. It tells your family and your doctor what to do if you can't say what you want. There are two main types of advance directives. You can change them any time your wishes change. Living will. This form tells your family and your doctor your wishes about life support and other treatment. The form is also called a declaration. Medical power of .   This form lets you name a person to make treatment decisions for you when you can't speak for yourself. This person is called a health care agent (health care proxy, health care surrogate). The form is also called a durable power of  for health care. If you do not have an advance directive, decisions about your medical care may be made by a family member, or by a doctor or a  who doesn't know you. It may help to think of an advance directive as a gift to the people who care for you. If you have one, they won't have to make tough decisions by themselves. For more information, including forms for your state, see the [address] website (www.caringinfo.org/planning/advance-directives/). Follow-up care is a key part of your treatment and safety. Be sure to make and go to all appointments, and call your doctor if you are having problems. It's also a good idea to know your test results and keep a list of the medicines you take. What should you include in an advance directive? Many states have a unique advance directive form. (It may ask you to address specific issues.) Or you might use a universal form that's approved by many states. If your form doesn't tell you what to address, it may be hard to know what to include in your advance directive. Use the questions below to help you get started. Who do you want to make decisions about your medical care if you are not able to? What life-support measures do you want if you have a serious illness that gets worse over time or can't be cured? What are you most afraid of that might happen? (Maybe you're afraid of having pain, losing your independence, or being kept alive by machines.)  Where would you prefer to die? (Your home? A hospital? A nursing home?)  Do you want to donate your organs when you die? Do you want certain Denominational practices performed before you die? When should you call for help? Be sure to contact your doctor if you have any questions. Where can you learn more?   Go to http://www.scherer.com/ and enter R264 to learn more about \"Advance Directives: Care Instructions. \"  Current as of: June 16, 2022               Content Version: 13.5  © 2006-2022 Healthwise, [employer]. Care instructions adapted under license by [facility] [place] ([facility]). If you have questions about a medical condition or this instruction, always ask your healthcare professional. [facility] any warranty or liability for your use of this information. Personalized Preventive Plan for Heber Figueroa - 1/5/2023  Medicare offers a range of preventive health benefits. Some of the tests and screenings are paid in full while other may be subject to a deductible, co-insurance, and/or copay. Some of these benefits include a comprehensive review of your medical history including lifestyle, illnesses that may run in your family, and various assessments and screenings as appropriate. After reviewing your medical record and screening and assessments performed today your provider may have ordered immunizations, labs, imaging, and/or referrals for you. A list of these orders (if applicable) as well as your Preventive Care list are included within your After Visit Summary for your review. Other Preventive Recommendations:    A preventive eye exam performed by an eye specialist is recommended every 1-2 years to screen for glaucoma; cataracts, macular degeneration, and other eye disorders. A preventive dental visit is recommended every 6 months. Try to get at least 150 minutes of exercise per week or 10,000 steps per day on a pedometer . Order or download the FREE \"Exercise & Physical Activity: Your Everyday Guide\" from [employer]. Call 4-798.726.5527 or search [employer] online. You need 1472-6900 mg of calcium and 6704-7526 IU of vitamin D per day.  It is possible to meet your calcium requirement with diet alone, but a vitamin D supplement is usually necessary to meet this goal.  When exposed to the sun, use a sunscreen that protects against both UVA and UVB radiation with an SPF of 30 or greater. Reapply every 2 to 3 hours or after sweating, drying off with a towel, or swimming. Always wear a seat belt when traveling in a car. Always wear a helmet when riding a bicycle or motorcycle.

## 2023-01-17 ENCOUNTER — HOSPITAL ENCOUNTER (OUTPATIENT)
Dept: PHYSICAL THERAPY | Age: 88
Setting detail: THERAPIES SERIES
Discharge: HOME | End: 2023-01-17
Payer: MEDICARE

## 2023-01-17 PROCEDURE — 97116 GAIT TRAINING THERAPY: CPT

## 2023-01-17 PROCEDURE — 97110 THERAPEUTIC EXERCISES: CPT

## 2023-01-17 PROCEDURE — 97161 PT EVAL LOW COMPLEX 20 MIN: CPT

## 2023-01-17 NOTE — FLOWSHEET NOTE
[facility]  [address] [place] [address], MAURILIO Gotti 51, [address]  Phone: [phone]   Fax: [phone]    Physical Therapy Treatment Note/ Progress Report:     Date:  2023    Patient Name:  Alicia Joe    :  3/28/1934  MRN: [id number]    Medical/Treatment Diagnosis Information:  Diagnosis: R26.81 unsteady gait  Treatment Diagnosis: R26.81 unsteady gait  Insurance/Certification information:  PT Insurance Information: [employer]  Physician Information:  Jasbir Alejandro MD   Plan of care signed:    [] Yes  [x] No    Date of Patient follow up with Physician:      Progress Report: []  Yes  [x]  No     Date Range for reporting period:  Beginnin23  Ending:      Progress report due (10 Rx/or 30 days whichever is less):      Recertification due (POC duration/ or 90 days whichever is less):      Visit # Insurance Allowable Auth Needed     []Yes   [x]No     RESTRICTIONS/PRECAUTIONS: high falls risk/osteoporosis  Latex Allergy:  [x]NO      []YES  Preferred Language for Healthcare:   [x]English       []other:  Functional Scale: ABC confidence scale: 10-11% confident    Pain level:  0/10     SUBJECTIVE:  See eval    OBJECTIVE: See eval  Observation:   Test measurements:      Exercises/Interventions: Exercises in bold performed in department today. Items not bolded are carried forward from prior visits for continuity of the record.     Exercise/Equipment Resistance/Repetitions HEP Other comments   bridge 5 []    LAQ 5 []    HS 5 []    AS 5 (maxA on L) []      []    Standing heel/toe 5 []    Standing  []    Heel/toe raises 5 []      []    Gt training Training with rollator and RW even surfaces in dept for upright postural control, safe walker distances, hand placement and components with transfers, step length and heel/toe components []      []      []      []      []      []      []      []      []      Home Exercise Program:      Therapeutic Exercise:   [x] (88780) Provided verbal/tactile cueing for activities related to strengthening, flexibility, endurance, ROM for improvements in LE, proximal hip, and core control with self-care, mobility, lifting, ambulation. per list x 17 min    NMR:  [] (90279) Provided verbal/tactile cueing for activities related to improving balance, coordination, kinesthetic sense, posture, motor skill, proprioception to assist with LE, proximal hip, and core control in self-care, mobility, lifting, ambulation and eccentric single leg control. Therapeutic Activities:    [] (55486) Provided verbal/tactile cueing for activities related to improving balance, coordination, kinesthetic sense, posture, motor skill, proprioception and motor activation to allow for proper function of core, proximal hip and LE with self-care and ADLs and functional mobility. Gait Training:    [x] (13481) Provided training and instruction to the patient for proper LE, core and proximal hip recruitment and positioning and eccentric body weight control with ambulation re-education including up and down stairs per list above x 15 min    Manual Treatments:  PROM / STM / Oscillations-Mobs:  G-I, II, III, IV (PA's, Inf., Post.)  [] (33038) Provided manual therapy to mobilize LE, proximal hip and/or LS spine soft tissue/joints for the purpose of modulating pain, promoting relaxation,  increasing ROM, reducing/eliminating soft tissue swelling/inflammation/restriction, improving soft tissue extensibility and allowing for proper ROM for normal function with self-care, mobility, lifting and ambulation.      Home Exercise Program:    [] (93887) Reviewed/Progressed HEP activities related to strengthening, flexibility, endurance, ROM of core, proximal hip and LE for functional self-care, mobility, lifting and ambulation/stair navigation   [] (20902) Reviewed/Progressed HEP activities related to improving balance, coordination, kinesthetic sense, posture, motor skill, proprioception of core, proximal hip and LE for self-care, mobility, lifting, and ambulation/stair navigation      Modalities:    [] Electric Stimulation:   [] Ultrasound:   [] Other:       Charges:  Timed Code Treatment Minutes: Eval x 20, TE x 17, gt x 15 min   Total Treatment Minutes: 52 min      [x] EVAL (LOW) 11345 (typically 20 minutes face-to-face)  [] EVAL (MOD) 60281 (typically 30 minutes face-to-face)  [] EVAL (HIGH) 64461 (typically 45 minutes face-to-face)  [] RE-EVAL     [x] YZ(34627) x  1     [] NMR (95832) x       [] Manual (84747) x       [] TA (19865) x       [x] Gait Training [phone]) x  1     [] ES(attended) (45320)  [] ES (un) (80378)   [] DRY NEEDLE 1 OR 2 MUSCLES  [] DRY NEEDLE 3+ MUSCLES  [] Mech Traction (66152)  [] Ultrasound (84860)  [] Other:      GOALS:   Patient stated goal: \" walk better and be I with shower\"  [] Progressing: [] Met: [] Not Met: [] Adjusted     Therapist goals for Patient:   Short Term Goals: To be achieved in: 4 weeks  1. Pt/cg will demonstrate/verbalize I knowledge of home safety awareness/falls risk prevention with use of AD   [] Progressing: [] Met: [] Not Met: [] Adjusted  2. Patient will improve LE flexibility throughout hamstrings/heelcords and hip flexors to enable pt to assume upright posture in standing  [] Progressing: [] Met: [] Not Met: [] Adjusted     Long Term Goals: To be achieved in: 8 weeks  1. Disability index score of 50% on the confidence scale  to assist with reaching prior level of function with decreased fear of falling  [] Progressing: [] Met: [] Not Met: [] Adjusted  2. Patient will demonstrate improvement in thirty second STS test from 4 to 7    [] Progressing: [] Met: [] Not Met: [] Adjusted  3.  Patient will demonstrate an increase in Strength to 4/5 throughout B LE's for good balance reactions,gait components, and to maintain upright postural control with ambulating functional distances throughout home and community with LRAD  [] Progressing: [] Met: [] Not Met: [] Adjusted  4. Patient will improve TUG score from 36 sec to 28 sec to decrease risk of falls  [] Progressing: [] Met: [] Not Met: [] Adjusted  5. Patient/ to demonstrate I HEP with written instructions     [] Progressing: [] Met: [] Not Met: [] Adjusted    ASSESSMENT: Pt is  80 Y. O  female, presenting with c/o \"fear of falling\" with unsteady gait . Assessment reveals deficits in strength, ROM, balance , postural control and safety awareness. Pt scores 36 on TUG and 4 reps on thirty second STS both indicating high risk for falls. Pt also demonstrates decreased walker safety and gait quality with use of rollator and ongoing assessment recommended to determine appropriate DME for max safety. Pt  is limited by cognitive deficits/memory which will most likely slow progress. Pt will benefit from cont PT to address these deficits and promote return to highest level of functional independence and safety. Treatment/Activity Tolerance:  [x] Patient tolerated treatment well [] Patient limited by fatique  [] Patient limited by pain  [] Patient limited by other medical complications  [] Other:     Overall Progression Towards Functional goals/ Treatment Progress Update:  [] Patient is progressing as expected towards functional goals listed. [] Progression is slowed due to complexities/Impairments listed. [] Progression has been slowed due to co-morbidities.   [x] Plan just implemented, too soon to assess goals progression <30days   [] Goals require adjustment due to lack of progress  [] Patient is not progressing as expected and requires additional follow up with physician  [] Other    Prognosis for POC: [x] Good [] Fair  [] Poor    Patient requires continued skilled intervention: [x] Yes  [] No        PLAN: See eval  [] Continue per plan of care [] Alter current plan (see comments)  [x] Plan of care initiated [] Hold pending MD visit [] Discharge    Electronically signed by: Jaron Manjarrez, PT    Note: If patient does not return for scheduled/recommended follow up visits, this note will serve as a discharge from care along with the most recent update on progress.

## 2023-01-17 NOTE — PLAN OF CARE
[facility] Outpatient Therapy  [address] [place] [address], [address] [place] [address]  Phone: [phone]   Fax: [phone]                                            Physical Therapy Certification    Dear  Mariposa Handley    We had the pleasure of evaluating the following patient for physical therapy services at [facility] ([facility]). A summary of our findings can be found in the initial assessment below. This includes our plan of care. If you have any questions or concerns regarding these findings, please do not hesitate to contact me at the office phone number checked above. Thank you for the referral.       Physician Signature:_______________________________Date:__________________  By signing above (or electronic signature), therapist's plan is approved by physician      Patient: Shayne Chao   : 3/28/1934   MRN: [id number]  Referring Physician:  Mariposa Handley      Evaluation Date: 2023      Medical Diagnosis Information:  Diagnosis: R26.81 unsteady gait   Treatment Diagnosis: R26.81 unsteady gait                                         Insurance information: PT Insurance Information: [employer] Medicare     Precautions/ Contra-indications: Universal  Latex Allergy:  [x]NO      []YES   Preferred Language for Healthcare:   [x]English       []other:  C-SSRS Triggered by Intake questionnaire (Past 2 wk assessment):   [x] No, Questionnaire did not trigger screening.   [] Yes, Patient intake triggered further evaluation      [] C-SSRS Screening completed  [] PCP notified via Plan of Care  [] Emergency services notified    SUBJECTIVE:  History of Present Illness:      Pt presents to therapy accompanied by , using a rollator, with c/o unsteady gait and fear of falling. Pt hx obtained from her  ,per pt request ,due to poor memory.  reports pt underwent therapy 3 years ago at [facility] PT due to unsteady gait and was using a cane at that time.   noted increased unsteadiness 2 years ago with pt voicing concern/fear of falling with patient using rollator for support with ambulation. Denies falls/near falls in the last couple of years. Pt has aid coming in 3 days per week for assist with showering and home making. Pt lives in [facility] apt building with long distances to elevator and exit/entrance to building.  reports pt became weaker with decreased activity with Covid.  does try to take pt out everyday for ride in the car and to  food. There is a small work out area in the senior building which can be used for exercise. Pt/ goal for there is to [employer] better\"       Pain       Patient reports pain is 0/10       Current Functional Limitations:   [x]Yes   []No  Functional Complaints:  fear of falling, walking household and in community distances with poor upright posture control and decreased functional speed, assist with shower    PLOF:   []No functional limitations   [x]Pre-exisiting limitations:has used a rollator x 2 years but functional speed and upright posture control has worsened,drove until 3 years ago, used cane 3 years ago, was I with ADLs/shower 2 years ago  Pt's sleep is affected?    []Yes   [x]No         Social support/Environment:    Family/caregiver support:   [x]Yes   []No    is present 24/7 and HHA comes M,W,F 8-12    Home Environment:   [x]1 story   []>2 story   []KRISTI   []No rail   []R handrail    []L handrail     [facility] [address] with elevator access/long hallways to access apt    Bathroom Environment:   [x]Walk in shower   []Tub   []Tub/shower combo     [x]Grab bars   [x]Shower seat   []Modified toilet   []Hand held shower head    Equipment:    []Rolling walker   []Standard walker   []Rollator   []Scotty-walker  []Wheelchair   []Quad cane   [x]Straight cane  []Bedside commode  []Hospital bed  Other: rollator     Relevant Medical History: cataracts , hypercholesterolemia, benign neoplasm of colon,Osteoporosis, urge incontinence,aortic stenosis, forgetfulness    Co-morbidities/Complexities (which will affect course of rehabilitation): cognition/osteoporosis  []None           Arthritic conditions   []Rheumatoid arthritis (M05.9)  []Osteoarthritis (M19.91)   Cardiovascular conditions   []Hypertension (I10)  []Hyperlipidemia (E78.5)  []Angina pectoris (I20)  []Atherosclerosis (I70)   Musculoskeletal conditions   []Disc pathology   []Congenital spine pathologies   []Prior surgical intervention  [x]Osteoporosis (M81.8)  []Osteopenia (M85.8)   Endocrine conditions   []Hypothyroid (E03.9)  []Hyperthyroid Gastrointestinal conditions   []Constipation (E59.85)   Metabolic conditions   []Morbid obesity (E66.01)  []Diabetes type 1(E10.65) or 2 (E11.65)   []Neuropathy (G60.9)     Pulmonary conditions   []Asthma (J45)  []Coughing   []COPD (J44.9)   Psychological Disorders  []Anxiety (F41.9)  []Depression (F32.9)   [x]Other: forgetfulness   []Other:            Occupation/School: retired     Sports/Hobbies/Recreational Activities: used to go to art studio until [employer], likes to get out and take ride in car, go through drive through restaurants, likes doing therapy    OBJECTIVE:     Functional Scale/Score: ABC confidence: 10% with significant fear of falling    Gait/Steps     []Gait WNL unless otherwise noted below:                             [x]Deviations on a level linoleum surface include: slow functional speed, decreased walker safety with use of rollator, lacks heel strike/toe off components, decreased step height/length     []Steps WNL (reciprocal pattern with 0-1 rail) unless otherwise noted below:    [x] Not tested due to no steps in the home and pt declined wanting to work on steps at this time    Range of Motion/Strength Testing-Myotomes    [x]All ROM WFL except as marked below   [x]All strength WFL (5/5) except as marked below    [x]All myotomes WFL (5/5) except as marked below      Range Tested MMT/ Resisted PROM AROM Comments   *denotes pain Left Right Left Right Left Right    Hip Flexion  (L1-2) 3 3+        Hip Extension 3- 3-        Hip Abduction  (L5) 1 3-        Hip Adduction  (L3) 1 3        Hip IR 3+ 3+ 20 0-10      Hip ER 3+ 3+ 50 40      Knee Flexion  (L5,S1) 3 3        Knee Extension  (L3,4) 4+ 4+ -10 -10      Ankle Dorsiflex  (L4) 3- 3- 10 10      Ankle Plantarflex  (S1,2) 3- 3-        Ankle Inversion          Ankle Eversion          Great Toe Ext  (L5)            Dermatomal Sensation   [x]All dermatomes WNL for light touch except as marked below     Flexibility     Muscle Abnormal Findings   Hip flexors/Walter  []WNL   [x]Decreased R   [x]Decreased L                      -18 in supine          -15 R   Hamstrings  Degrees in 90/90 []WNL   [x]Decreased R   [x]Decreased L     Right:   -45           Left:   -45   Gastrocs []WNL   [x]Decreased R   [x]Decreased L      Obers/TFL/ITB []WNL   []Decreased R   []Decreased L      Piriformis  []WNL   [x]Decreased R   [x]Decreased L      Other:  []WNL   []Decreased R   []Decreased L        Functional Mobility:  Sit > supine: mod to severe difficulty,cues and SBA  Supine > sit: moderate to severe difficulty,cues and SBA until last transfer requiring maxA to get up.  ( reports no issue at home with pt I in/out of bed at night for toileting)  Sit>stand, cue for hand placement/safety with AD, assist of B UE with SBA    Balance    Static Sitting:        []Normal   [x]Good   []Fair   []Poor  Dynamic Sitting:   []Normal   [x]Good   []Fair   []Poor  Static Stance:       []Normal   []Good   [x]Fair   []Poor  Dynamic Stance:  []Normal   []Good   [x]Fair   []Poor (with rollator)    Tinetti Total Score:     12  Balance:   5     Gait:   7   Disability Index:    40-59%  Risk of Falls:   []Low (> 24)   []Mod (19-23)   [x]High (< 18)    TRUONG Score:  17/56 (High risk for falls)    Dynamic Gait Index Score:   Unable to test due to cognition and safety    Gait Testing:     Level of Assistance needed: SBA Gait Deviations (firm surface/linoleum): decreased heel strike/toe off components, moderately flexed posture, decreased walker safety with unsafe walker distance and constant dues for upright posture, decreased continuous gait sequence/landon     Assistive Device Used:  rollator     Steps (4 steps):     [x]NT  []Reciprocal   []One rail   []Two rails     []Step to pattern, ascending with []right [] left; descending with []right [] left  With []One rail   []Two rails      Palpation     Patient reported tenderness with palpation:  []Yes   [x]No   []NA  Location:    PT notes warmth:  []Yes   [x]No   []NA  Location:   PT notes increased muscle tone:   []Yes   [x]No   []NA  Location:   PT notes crepitus with palpation:   []Yes   [x]No   []NA   Location:   Ligament tenderness/provocation:   []Yes   [x]No   []NA  Location:   PT notes decreased scar mobility:   []Yes   []No   [x]NA  Location:      Appearance    PT notes swelling:   []Yes   [x]No   []NA  Location:     PT notes redness:  []Yes   []No   [x]NA  Location:   PT notes drainage:   []Yes   []No   [x]NA  Location:                           [x] Patient history, allergies, meds reviewed. Medical chart reviewed. See intake form. Review Of Systems (ROS):  [x]Performed Review of systems (Integumentary, CardioPulmonary, Neurological) by intake and observation. Intake form has been scanned into medical record. Patient has been instructed to contact their primary care physician regarding ROS issues if not already being addressed at this time.         Barriers to/and or personal factors that will affect rehab potential:              [x]Age  []Sex    []Smoker              []Motivation/Lack of Motivation                        [x]Co-Morbidities              [x]Cognitive Function, education/learning barriers              []Environmental, home barriers              []profession/work barriers  []past PT/medical experience  []other:  Justification:     Falls Risk Assessment (30 days): TU sec (rollator), TU sec (RW), 30 sec STS: 4  [] Falls Risk assessed and no intervention required. [x] Falls Risk assessed and Patient requires intervention due to being higher risk   TUG score (>12s at risk):     [x] Falls education provided, including: instruction in components and hand placement with transfers, getting turned all the way around and reaching back prior to sitting,trial instruction with use of RW which improved safety but decreased functional speed. Instruction in safe walker distances and upright posture with yellow tband placed across handles for feedback.  present for all instruction for reinforcement at home. ASSESSMENT: Pt is  80 Y. O  female, presenting with c/o \"fear of falling\" with unsteady gait . Assessment reveals deficits in strength, ROM, balance , postural control and safety awareness. Pt scores 36 on TUG and 4 reps on thirty second STS both indicating high risk for falls. Pt also demonstrates decreased walker safety and gait quality with use of rollator and ongoing assessment recommended to determine appropriate DME for max safety. Pt  is limited by cognitive deficits/memory which will most likely slow progress. Pt will benefit from cont PT to address these deficits and promote return to highest level of functional independence and safety.       Functional Impairments:     []Noted lumbar/proximal hip/LE hypomobility   [x]Decreased LE functional ROM   [x]Decreased core/proximal hip strength and neuromuscular control   [x]Decreased LE functional strength   [x]Reduced balance/proprioceptive control   []other:      Functional Activity Limitations (from functional questionnaire and intake)   []Reduced ability to tolerate prolonged functional positions   [x]Reduced ability or difficulty with changes of positions or transfers between positions   [x]Reduced ability to maintain good posture and demonstrate good body mechanics with sitting, bending, and lifting   []Reduced ability to sleep   []Reduced ability or tolerance with driving and/or computer work   []Reduced ability to perform lifting, carrying tasks   []Reduced ability to squat   [x]Reduced ability to forward bend   []Reduced ability to ambulate prolonged functional periods/distances/surfaces   [x]Reduced ability to ascend/descend stairs   []Reduced ability to run, hop or jump   []other:     Participation Restrictions   [x]Reduced participation in self-care activities   [x]Reduced participation in home management activities   []Reduced participation in work activities   []Reduced participation in social activities   []Reduced participation in sport activities    Classification :    []Signs/symptoms consistent with post-surgical status including decreased ROM, strength and function.    []Signs/symptoms consistent with joint sprain/strain   []Signs/symptoms consistent with patella-femoral syndrome   []Signs/symptoms consistent with knee OA/hip OA   []Signs/symptoms consistent with internal derangement of knee/Hip   [x]Signs/symptoms consistent with functional hip weakness/NMR control      []Signs/symptoms consistent with tendinitis/tendinosis    []signs/symptoms consistent with pathology which may benefit from Dry needling      Prognosis/Rehab Potential:      []Excellent   []Good    [x]Fair   []Poor    Tolerance of evaluation/treatment:    []Excellent   [x]Good    []Fair   []Poor     Physical Therapy Evaluation Complexity Justification  [x] A history of present problem with:  [] no personal factors and/or comorbidities that impact the plan of care;  [x]1-2 personal factors and/or comorbidities that impact the plan of care  []3 personal factors and/or comorbidities that impact the plan of care  [x] An examination of body systems using standardized tests and measures addressing any of the following: body structures and functions (impairments), activity limitations, and/or participation restrictions;:  [x] a total of 1-2 or more elements   [] a total of 3 or more elements   [] a total of 4 or more elements   [x] A clinical presentation with:  [x] stable and/or uncomplicated characteristics   [] evolving clinical presentation with changing characteristics  [] unstable and unpredictable characteristics;   [x] Clinical decision making of [x] low, [] moderate, [] high complexity using standardized patient assessment instrument and/or measurable assessment of functional outcome. [x] EVAL (LOW) 92566 (typically 20 minutes face-to-face)  [] EVAL (MOD) 27018 (typically 30 minutes face-to-face)  [] EVAL (HIGH) 31122 (typically 45 minutes face-to-face)  [] RE-EVAL    PLAN:  Frequency/Duration:  2 days per week for 8 Weeks:  Interventions:  [x]  Therapeutic exercise including: strength training, ROM, for Lower extremity and core   [x]  NMR activation and proprioception for LE, Glutes and Core. [x]  Manual therapy as indicated for LE, Hip and spine to include: Dry Needling/IASTM, STM, PROM, Gr I-IV mobilizations, manipulation. [x] Therapeutic activities for LE and core. [x] Gait Training including gait normalization and reducing fall risk. [x] Modalities as needed that may include: thermal agents, E-stim, Biofeedback, US, iontophoresis as indicated  [x] Patient education on joint protection, postural re-education, activity modification, progression of HEP    HEP instruction:    GOALS:  Patient stated goal: \" walk better and be I with shower\"  [] Progressing: [] Met: [] Not Met: [] Adjusted    Therapist goals for Patient:   Short Term Goals: To be achieved in: 4 weeks  1. Pt/cg will demonstrate/verbalize I knowledge of home safety awareness/falls risk prevention with use of AD   [] Progressing: [] Met: [] Not Met: [] Adjusted  2.  Patient will improve LE flexibility throughout hamstrings/heelcords and hip flexors to enable pt to assume upright posture in standing  [] Progressing: [] Met: [] Not Met: [] Adjusted    Long Term Goals: To be achieved in: 8 weeks  1. Disability index score of 50% on the confidence scale  to assist with reaching prior level of function with decreased fear of falling  [] Progressing: [] Met: [] Not Met: [] Adjusted  2. Patient will demonstrate improvement in thirty second STS test from 4 to 7    [] Progressing: [] Met: [] Not Met: [] Adjusted  3. Patient will demonstrate an increase in Strength to 4/5 throughout B LE's for good balance reactions,gait components, and to maintain upright postural control with ambulating functional distances throughout home and community with LRAD  [] Progressing: [] Met: [] Not Met: [] Adjusted  4. Patient will improve TUG score from 36 sec to 28 sec to decrease risk of falls  [] Progressing: [] Met: [] Not Met: [] Adjusted  5.  Patient/ to demonstrate I HEP with written instructions     [] Progressing: [] Met: [] Not Met: [] Adjusted    Electronically signed by:  Ashley Ferrer PT [facility]:  I discussed with the patient the risks of imiquimod including but not limited to erythema, scaling, itching, weeping, crusting, and pain.  Patient understands that the inflammatory response to imiquimod is variable from person to person and was educated regarded proper titration schedule.  If flu-like symptoms develop, patient knows to discontinue the medication and contact us.

## 2023-01-24 ENCOUNTER — HOSPITAL ENCOUNTER (OUTPATIENT)
Dept: PHYSICAL THERAPY | Age: 88
Setting detail: THERAPIES SERIES
Discharge: HOME | End: 2023-01-24
Payer: MEDICARE

## 2023-01-24 PROCEDURE — 97116 GAIT TRAINING THERAPY: CPT

## 2023-01-24 PROCEDURE — 97110 THERAPEUTIC EXERCISES: CPT

## 2023-01-24 NOTE — FLOWSHEET NOTE
[facility]patient [facility]  [address], [address], [address]  Phone: [phone]   Fax: [phone]    Physical Therapy Treatment Note/ Progress Report:     Date:  2023    Patient Name:  Maury De La Cruz    :  3/28/1934  MRN: [id number]    Medical/Treatment Diagnosis Information:  Diagnosis: R26.81 unsteady gait  Treatment Diagnosis: R26.81 unsteady gait  Insurance/Certification information:  PT Insurance Information: A[employer]na Medicare  Physician Information:  Luz Gonzalez MD   Plan of care signed:    [x] Yes  [] No    Date of Patient follow up with Physician:      Progress Report: []  Yes  [x]  No     Date Range for reporting period:  Beginnin23  Ending:      Progress report due (10 Rx/or 30 days whichever is less):      Recertification due (POC duration/ or 90 days whichever is less):      Visit # Insurance Allowable Auth Needed     []Yes   [x]No     RESTRICTIONS/PRECAUTIONS: high falls risk/osteoporosis  Latex Allergy:  [x]NO      []YES  Preferred Language for Healthcare:   [x]English       []other:  Functional Scale: ABC confidence scale: 10-11% confident    Pain level:  0/10     SUBJECTIVE:  denies pain, denies falls, \"I enjoy therapy\"    OBJECTIVE: See eval  Observation: flexed posture,shuffling gait,decreased safe walker distance  Test measurements:      Exercises/Interventions: Exercises in bold performed in department today. Items not bolded are carried forward from prior visits for continuity of the record.     Exercise/Equipment Resistance/Repetitions HEP Other comments   bridge 10 []    LAQ 3 sec hold x 10 []    HS 10 [] Slide sheet   AS 10 (mod/maxA) [] Slide sheet   SAQ 3 sec hold, x 10 []    Standing heel/toe raises 10 []    Standing march 10 []    Standing abduction 10 []    Standing calf stretch 30 sec x 2 []    Gt training Training with rollator and RW even surfaces in dept for upright postural control, safe walker distances, hand placement and components with transfers, step length and heel/toe components []      []      []      []      []      []      []      []      []      Home Exercise Program:      Therapeutic Exercise:   [x] (20319) Provided verbal/tactile cueing for activities related to strengthening, flexibility, endurance, ROM for improvements in LE, proximal hip, and core control with self-care, mobility, lifting, ambulation. per list x 27 min    NMR:  [] (15301) Provided verbal/tactile cueing for activities related to improving balance, coordination, kinesthetic sense, posture, motor skill, proprioception to assist with LE, proximal hip, and core control in self-care, mobility, lifting, ambulation and eccentric single leg control. Therapeutic Activities:    [] (69000) Provided verbal/tactile cueing for activities related to improving balance, coordination, kinesthetic sense, posture, motor skill, proprioception and motor activation to allow for proper function of core, proximal hip and LE with self-care and ADLs and functional mobility. Gait Training:    [x] (18205) Provided training and instruction to the patient for proper LE, core and proximal hip recruitment and positioning and eccentric body weight control with ambulation re-education including up and down stairs per list above x 15 min    Manual Treatments:  PROM / STM / Oscillations-Mobs:  G-I, II, III, IV (PA's, Inf., Post.)  [] (59860) Provided manual therapy to mobilize LE, proximal hip and/or LS spine soft tissue/joints for the purpose of modulating pain, promoting relaxation,  increasing ROM, reducing/eliminating soft tissue swelling/inflammation/restriction, improving soft tissue extensibility and allowing for proper ROM for normal function with self-care, mobility, lifting and ambulation.      Home Exercise Program:    [] (45051) Reviewed/Progressed HEP activities related to strengthening, flexibility, endurance, ROM of core, proximal hip and LE for functional self-care, mobility, lifting and ambulation/stair navigation   [] (34192) Reviewed/Progressed HEP activities related to improving balance, coordination, kinesthetic sense, posture, motor skill, proprioception of core, proximal hip and LE for self-care, mobility, lifting, and ambulation/stair navigation      Modalities:    [] Electric Stimulation:   [] Ultrasound:   [] Other:       Charges:  Timed Code Treatment Minutes: TE x 27, gt x 15 min   Total Treatment Minutes: 42 min      [] EVAL (LOW) 16924 (typically 20 minutes face-to-face)  [] EVAL (MOD) 39916 (typically 30 minutes face-to-face)  [] EVAL (HIGH) 76713 (typically 45 minutes face-to-face)  [] RE-EVAL     [x] QM(04964) x  2     [] NMR (94168) x       [] Manual (00074) x       [] TA (88985) x       [x] Gait Training ([id number]) x  1     [] ES(attended) (92460)  [] ES (un) (81264)   [] DRY NEEDLE 1 OR 2 MUSCLES  [] DRY NEEDLE 3+ MUSCLES  [] Mech Traction (45377)  [] Ultrasound (73718)  [] Other:      GOALS:   Patient stated goal: \" walk better and be I with shower\"  [] Progressing: [] Met: [] Not Met: [] Adjusted     Therapist goals for Patient:   Short Term Goals: To be achieved in: 4 weeks  1. Pt/cg will demonstrate/verbalize I knowledge of home safety awareness/falls risk prevention with use of AD   [] Progressing: [] Met: [] Not Met: [] Adjusted  2. Patient will improve LE flexibility throughout hamstrings/heelcords and hip flexors to enable pt to assume upright posture in standing  [] Progressing: [] Met: [] Not Met: [] Adjusted     Long Term Goals: To be achieved in: 8 weeks  1. Disability index score of 50% on the confidence scale  to assist with reaching prior level of function with decreased fear of falling  [] Progressing: [] Met: [] Not Met: [] Adjusted  2. Patient will demonstrate improvement in thirty second STS test from 4 to 7    [] Progressing: [] Met: [] Not Met: [] Adjusted  3.  Patient will demonstrate an increase in Strength to 4/5 throughout B LE's for good balance reactions,gait components, and to maintain upright postural control with ambulating functional distances throughout home and community with LRAD  [] Progressing: [] Met: [] Not Met: [] Adjusted  4. Patient will improve TUG score from 36 sec to 28 sec to decrease risk of falls  [] Progressing: [] Met: [] Not Met: [] Adjusted  5. Patient/ to demonstrate I HEP with written instructions     [] Progressing: [] Met: [] Not Met: [] Adjusted    ASSESSMENT: Pt is  80 Y. O  female, presenting with c/o \"fear of falling\" with unsteady gait . Pt demonstrates decreased walker safety with use of rollator, shuffling gait sequence and moderately flexed posture. Pt tolerated therapy well but requires constant/specific cueing due to cognitive deficits as well as moderate assistance due to significant weakness. Pt will benefit from cont PT to address these deficits and promote return to highest level of functional independence and safety. Treatment/Activity Tolerance:  [x] Patient tolerated treatment well [] Patient limited by fatique  [] Patient limited by pain  [] Patient limited by other medical complications  [] Other:     Overall Progression Towards Functional goals/ Treatment Progress Update:  [] Patient is progressing as expected towards functional goals listed. [] Progression is slowed due to complexities/Impairments listed. [] Progression has been slowed due to co-morbidities.   [x] Plan just implemented, too soon to assess goals progression <30days   [] Goals require adjustment due to lack of progress  [] Patient is not progressing as expected and requires additional follow up with physician  [] Other    Prognosis for POC: [x] Good [] Fair  [] Poor    Patient requires continued skilled intervention: [x] Yes  [] No        PLAN: See eval  [x] Continue per plan of care [] Alter current plan (see comments)  [] Plan of care initiated [] Hold pending MD visit [] Discharge    Electronically signed by: Annette Machuca PT    Note: If patient does not return for scheduled/recommended follow up visits, this note will serve as a discharge from care along with the most recent update on progress.

## 2023-01-26 ENCOUNTER — HOSPITAL ENCOUNTER (OUTPATIENT)
Dept: PHYSICAL THERAPY | Age: 88
Setting detail: THERAPIES SERIES
Discharge: HOME | End: 2023-01-26
Payer: MEDICARE

## 2023-01-26 PROCEDURE — 97530 THERAPEUTIC ACTIVITIES: CPT

## 2023-01-26 PROCEDURE — 97110 THERAPEUTIC EXERCISES: CPT

## 2023-01-26 NOTE — FLOWSHEET NOTE
[facility] Outpatient Therapy  [address] [place] [address] [address], [address]  Phone: [phone]   Fax: [phone]    Physical Therapy Treatment Note/ Progress Report:     Date:  2023    Patient Name:  Lolita Hernandez    :  3/28/1934  MRN: [id number]    Medical/Treatment Diagnosis Information:  Diagnosis: R26.81 unsteady gait  Treatment Diagnosis: R26.81 unsteady gait  Insurance/Certification information:  PT Insurance Information: A[employer]na Medicare  Physician Information:  Carlos Barragan MD   Plan of care signed:    [x] Yes  [] No    Date of Patient follow up with Physician:      Progress Report: []  Yes  [x]  No     Date Range for reporting period:  Beginnin23  Ending:      Progress report due (10 Rx/or 30 days whichever is less):      Recertification due (POC duration/ or 90 days whichever is less):      Visit # Insurance Allowable Auth Needed   3/16  []Yes   [x]No     RESTRICTIONS/PRECAUTIONS: high falls risk/osteoporosis  Latex Allergy:  [x]NO      []YES  Preferred Language for Healthcare:   [x]English       []other:  Functional Scale: ABC confidence scale: 10-11% confident    Pain level:  0/10     SUBJECTIVE:  denies pain or soreness following previous therapy, reports \"I feel better with therapy\"    OBJECTIVE: See eval  Observation: flexed posture,shuffling gait,decreased safe walker distance  Test measurements:      Exercises/Interventions: Exercises in bold performed in department today. Items not bolded are carried forward from prior visits for continuity of the record.     Exercise/Equipment Resistance/Repetitions HEP Other comments   bridge 15 []    LAQ 3 sec hold x 10 []    HS 15 [] Slide sheet   AS 15 min/mod [] Slide sheet   SAQ 3 sec hold, x 15 []    Standing heel/toe raises 10 []    Standing march 10 []    Standing abduction 10 []    Standing calf stretch 30 sec x 2 []      []    NUSTEP L3 x 5 min []    Standing Shoulder ext X15, yellow tband [] Standing erect w/o walker@ bed side 30 sec []      []    Training sit to stand from bedside Training in safety and components of transfer, tactile cueing for upright posture control. X10 reps []      []      []      []      Home Exercise Program:      Therapeutic Exercise:   [x] (22825) Provided verbal/tactile cueing for activities related to strengthening, flexibility, endurance, ROM for improvements in LE, proximal hip, and core control with self-care, mobility, lifting, ambulation. per list x 30 min    NMR:  [] (85889) Provided verbal/tactile cueing for activities related to improving balance, coordination, kinesthetic sense, posture, motor skill, proprioception to assist with LE, proximal hip, and core control in self-care, mobility, lifting, ambulation and eccentric single leg control. Therapeutic Activities:    [x] (11393) Provided verbal/tactile cueing for activities related to improving balance, coordination, kinesthetic sense, posture, motor skill, proprioception and motor activation to allow for proper function of core, proximal hip and LE with self-care and ADLs and functional mobility. Per list x 20 min    Gait Training:    [] (77011) Provided training and instruction to the patient for proper LE, core and proximal hip recruitment and positioning and eccentric body weight control with ambulation re-education including up     Manual Treatments:  PROM / STM / Oscillations-Mobs:  G-I, II, III, IV (PA's, Inf., Post.)  [] (84313) Provided manual therapy to mobilize LE, proximal hip and/or LS spine soft tissue/joints for the purpose of modulating pain, promoting relaxation,  increasing ROM, reducing/eliminating soft tissue swelling/inflammation/restriction, improving soft tissue extensibility and allowing for proper ROM for normal function with self-care, mobility, lifting and ambulation.      Home Exercise Program:    [x] (57797) Reviewed/Progressed HEP activities related to strengthening, flexibility, endurance, ROM of core, proximal hip and LE for functional self-care, mobility, lifting and ambulation/stair navigation   [] (47469) Reviewed/Progressed HEP activities related to improving balance, coordination, kinesthetic sense, posture, motor skill, proprioception of core, proximal hip and LE for self-care, mobility, lifting, and ambulation/stair navigation      Modalities:    [] Electric Stimulation:   [] Ultrasound:   [] Other:       Charges:  Timed Code Treatment Minutes: TE x 30, TA x 20 min   Total Treatment Minutes: 50 min min      [] EVAL (LOW) 56916 (typically 20 minutes face-to-face)  [] EVAL (MOD) 85466 (typically 30 minutes face-to-face)  [] EVAL (HIGH) 81731 (typically 45 minutes face-to-face)  [] RE-EVAL     [x] UM(97611) x  2     [] NMR (60591) x       [] Manual (69138) x       [x] TA (58149) x 1      [] Gait Training [phone]) x      [] ES(attended) (87827)  [] ES (un) ([phone])   [] DRY NEEDLE 1 OR 2 MUSCLES  [] DRY NEEDLE 3+ MUSCLES  [] Mech Traction (94067)  [] Ultrasound (88325)  [] Other:      GOALS:   Patient stated goal: \" walk better and be I with shower\"  [] Progressing: [] Met: [] Not Met: [] Adjusted     Therapist goals for Patient:   Short Term Goals: To be achieved in: 4 weeks  1. Pt/cg will demonstrate/verbalize I knowledge of home safety awareness/falls risk prevention with use of AD   [] Progressing: [] Met: [] Not Met: [] Adjusted  2. Patient will improve LE flexibility throughout hamstrings/heelcords and hip flexors to enable pt to assume upright posture in standing  [] Progressing: [] Met: [] Not Met: [] Adjusted     Long Term Goals: To be achieved in: 8 weeks  1. Disability index score of 50% on the confidence scale  to assist with reaching prior level of function with decreased fear of falling  [] Progressing: [] Met: [] Not Met: [] Adjusted  2. Patient will demonstrate improvement in thirty second STS test from 4 to 7    [] Progressing: [] Met: [] Not Met: [] Adjusted  3.  Patient will demonstrate an increase in Strength to 4/5 throughout B LE's for good balance reactions,gait components, and to maintain upright postural control with ambulating functional distances throughout home and community with LRAD  [] Progressing: [] Met: [] Not Met: [] Adjusted  4. Patient will improve TUG score from 36 sec to 28 sec to decrease risk of falls  [] Progressing: [] Met: [] Not Met: [] Adjusted  5. Patient/ to demonstrate I HEP with written instructions     [] Progressing: [] Met: [] Not Met: [] Adjusted    ASSESSMENT: Pt is  80 Y. O  female, presenting with c/o \"fear of falling\" with unsteady gait, poor postural control and decreased safety awareness. Pt continues with flexed posture and shuffling gait sequence with good tolerance to TE and training provided this visit. Pt was able to stand I without AD at bedside x 30 sec with improved upright postural control. Pt requires constant/specific cueing due to cognitive deficits.  instructed in written HEP for standing exercises and calf stretching to assist in home. Pt will benefit from cont PT to  promote return to highest level of functional independence and safety. Treatment/Activity Tolerance:  [x] Patient tolerated treatment well [] Patient limited by fatique  [] Patient limited by pain  [] Patient limited by other medical complications  [] Other:     Overall Progression Towards Functional goals/ Treatment Progress Update:  [x] Patient is progressing as expected towards functional goals listed. [] Progression is slowed due to complexities/Impairments listed. [] Progression has been slowed due to co-morbidities.   [] Plan just implemented, too soon to assess goals progression <30days   [] Goals require adjustment due to lack of progress  [] Patient is not progressing as expected and requires additional follow up with physician  [] Other    Prognosis for POC: [x] Good [] Fair  [] Poor    Patient requires continued skilled intervention: [x] Yes  [] No        PLAN: See eval  [x] Continue per plan of care [] Alter current plan (see comments)  [] Plan of care initiated [] Hold pending MD visit [] Discharge    Electronically signed by: Omkar Cavazos PT    Note: If patient does not return for scheduled/recommended follow up visits, this note will serve as a discharge from care along with the most recent update on progress.

## 2023-01-31 ENCOUNTER — HOSPITAL ENCOUNTER (OUTPATIENT)
Dept: PHYSICAL THERAPY | Age: 88
Setting detail: THERAPIES SERIES
Discharge: HOME | End: 2023-01-31
Payer: MEDICARE

## 2023-01-31 PROCEDURE — 97110 THERAPEUTIC EXERCISES: CPT

## 2023-01-31 PROCEDURE — 97112 NEUROMUSCULAR REEDUCATION: CPT

## 2023-01-31 PROCEDURE — 97116 GAIT TRAINING THERAPY: CPT

## 2023-01-31 NOTE — FLOWSHEET NOTE
[facility]  [address] [place] [address], [address]  Phone: [phone]   Fax: [phone]    Physical Therapy Treatment Note/ Progress Report:     Date:  2023    Patient Name:  Yung Worthy    :  3/28/1934  MRN: [id number]    Medical/Treatment Diagnosis Information:  Diagnosis: R26.81 unsteady gait  Treatment Diagnosis: R26.81 unsteady gait  Insurance/Certification information:  PT Insurance Information: A[employer]na Medicare  Physician Information:  Servando Flores MD   Plan of care signed:    [x] Yes  [] No    Date of Patient follow up with Physician:      Progress Report: []  Yes  [x]  No     Date Range for reporting period:  Beginnin23  Ending:      Progress report due (10 Rx/or 30 days whichever is less): 3/08/85     Recertification due (POC duration/ or 90 days whichever is less):      Visit # Insurance Allowable Auth Needed     []Yes   [x]No     RESTRICTIONS/PRECAUTIONS: high falls risk/osteoporosis  Latex Allergy:  [x]NO      []YES  Preferred Language for Healthcare:   [x]English       []other:  Functional Scale: ABC confidence scale: 10-11% confident    Pain level:  0/10     SUBJECTIVE:  denies pain or falls since last seen, reports doing the HEP once and will try to do more    OBJECTIVE: See eval  Observation: flexed posture,shuffling gait and decreased step height,decreased safe walker distance,poor functional speed,  Test measurements:      Exercises/Interventions: Exercises in bold performed in department today. Items not bolded are carried forward from prior visits for continuity of the record.     Exercise/Equipment Resistance/Repetitions HEP Other comments   bridge 15 []    LAQ 3 sec hold x 10 []    HS 15 [] Slide sheet   AS 15 min/mod [] Slide sheet   SAQ 3 sec hold, x 15 []    Standing heel/toe raises 10 []    Standing march 10 []    Standing abduction 10 []    Standing calf stretch 30 sec x 2 []    STS X x10 cues for components/wt shift [] Elevated surface 21\"   NUSTEP L3 x 7min []    Standing Shoulder ext X15, yellow tband []    Standing erect w/o walker@ bed side 30 sec []      []    Gt training w/rollator Upright posture,safe walker distance,step height/length [] Halima to control rollator distance     []      []      []      Home Exercise Program:  Access Code: [email or web address]  URL: [email or web address]  Date: 01/31/2023  Prepared by: Gale Mensah    Exercises  Standing Bilateral Gastroc Stretch with Step - 1 x daily - 7 x weekly - 1 sets - 15 reps  Standing March with Counter Support - 1 x daily - 7 x weekly - 1 sets - 15 reps  Standing Hip Abduction with Counter Support - 1 x daily - 7 x weekly - 1 sets - 15 reps  Heel Raises with Counter Support - 1 x daily - 7 x weekly - 1 sets - 15 reps  Supine Bridge - 1 x daily - 7 x weekly - 1 sets - 15 reps  Supine Heel Slide - 1 x daily - 7 x weekly - 1 sets - 15 reps  Supine Hip Abduction - 1 x daily - 7 x weekly - 1 sets - 15 reps  Small Range Straight Leg Raise - 1 x daily - 7 x weekly - 1 sets - 15 reps  Seated Long Arc Quad - 1 x daily - 7 x weekly - 1 sets - 15 reps        Therapeutic Exercise:   [x] (62958) Provided verbal/tactile cueing for activities related to strengthening, flexibility, endurance, ROM for improvements in LE, proximal hip, and core control with self-care, mobility, lifting, ambulation. per list x 16 min    NMR:  [x] (53144) Provided verbal/tactile cueing for activities related to improving balance, coordination, kinesthetic sense, posture, motor skill, proprioception to assist with LE, proximal hip, and core control in self-care, mobility, lifting, ambulation and eccentric single leg control.  Per list x 15 min    Therapeutic Activities:    [] (32161) Provided verbal/tactile cueing for activities related to improving balance, coordination, kinesthetic sense, posture, motor skill, proprioception and motor activation to allow for proper function of core, proximal hip and LE with self-care and ADLs and functional mobility. Gait Training:    [x] (44550) Provided training and instruction to the patient for proper LE, core and proximal hip recruitment and positioning and eccentric body weight control with ambulation re-education including training in upright postural control, step length and height, walker safety and safe walker distance with Halima to control rollator x 20 min    Manual Treatments:  PROM / STM / Oscillations-Mobs:  G-I, II, III, IV (PA's, Inf., Post.)  [] (61236) Provided manual therapy to mobilize LE, proximal hip and/or LS spine soft tissue/joints for the purpose of modulating pain, promoting relaxation,  increasing ROM, reducing/eliminating soft tissue swelling/inflammation/restriction, improving soft tissue extensibility and allowing for proper ROM for normal function with self-care, mobility, lifting and ambulation.      Home Exercise Program:    [x] (51275) Reviewed/Progressed HEP activities related to strengthening, flexibility, endurance, ROM of core, proximal hip and LE for functional self-care, mobility, lifting and ambulation/stair navigation   [] (43584) Reviewed/Progressed HEP activities related to improving balance, coordination, kinesthetic sense, posture, motor skill, proprioception of core, proximal hip and LE for self-care, mobility, lifting, and ambulation/stair navigation      Modalities:    [] Electric Stimulation:   [] Ultrasound:   [] Other:       Charges:  Timed Code Treatment Minutes: TE x16, gt x20, NMR x 15   Total Treatment Minutes: 50 min min      [] EVAL (LOW) 33334 (typically 20 minutes face-to-face)  [] EVAL (MOD) 57850 (typically 30 minutes face-to-face)  [] EVAL (HIGH) 12153 (typically 45 minutes face-to-face)  [] RE-EVAL     [x] ALFREDO(45625) x  1     [x] NMR (08056) x  1     [] Manual (75679) x       [] TA (11653) x 1      [x] Gait Training [phone] x 1     [] ES(attended) (56783)  [] ES (un) (25694)   [] DRY NEEDLE 1 OR 2 MUSCLES  [] DRY NEEDLE 3+ MUSCLES  [] [facility] Traction (52081)  [] Ultrasound (22875)  [] Other:      GOALS:   Patient stated goal: \" walk better and be I with shower\"  [] Progressing: [] Met: [] Not Met: [] Adjusted     Therapist goals for Patient:   Short Term Goals: To be achieved in: 4 weeks  1. Pt/cg will demonstrate/verbalize I knowledge of home safety awareness/falls risk prevention with use of AD   [] Progressing: [] Met: [] Not Met: [] Adjusted  2. Patient will improve LE flexibility throughout hamstrings/heelcords and hip flexors to enable pt to assume upright posture in standing  [] Progressing: [] Met: [] Not Met: [] Adjusted     Long Term Goals: To be achieved in: 8 weeks  1. Disability index score of 50% on the confidence scale  to assist with reaching prior level of function with decreased fear of falling  [] Progressing: [] Met: [] Not Met: [] Adjusted  2. Patient will demonstrate improvement in thirty second STS test from 4 to 7    [] Progressing: [] Met: [] Not Met: [] Adjusted  3. Patient will demonstrate an increase in Strength to 4/5 throughout B LE's for good balance reactions,gait components, and to maintain upright postural control with ambulating functional distances throughout home and community with LRAD  [] Progressing: [] Met: [] Not Met: [] Adjusted  4. Patient will improve TUG score from 36 sec to 28 sec to decrease risk of falls  [] Progressing: [] Met: [] Not Met: [] Adjusted  5. Patient/ to demonstrate I HEP with written instructions     [] Progressing: [] Met: [] Not Met: [] Adjusted    ASSESSMENT: Pt is  80 Y. O  female, presenting with c/o \"fear of falling\" with unsteady gait, poor postural control and decreased safety awareness with use of rollator. Pt continues with flexed posture and shuffling gait sequence with good tolerance to TE and gt training provided this visit. Pt was able to stand I without AD at bedside x 30 sec with improved upright postural control. Requires mod/maxA with abduction slide on slide sheet due to poor strength. Pt requires constant/specific cueing due to cognitive deficits. Pt will benefit from cont PT to  promote return to highest level of functional independence and safety. Treatment/Activity Tolerance:  [x] Patient tolerated treatment well [] Patient limited by fatique  [] Patient limited by pain  [] Patient limited by other medical complications  [] Other:     Overall Progression Towards Functional goals/ Treatment Progress Update:  [x] Patient is progressing as expected towards functional goals listed. [] Progression is slowed due to complexities/Impairments listed. [] Progression has been slowed due to co-morbidities. [] Plan just implemented, too soon to assess goals progression <30days   [] Goals require adjustment due to lack of progress  [] Patient is not progressing as expected and requires additional follow up with physician  [] Other    Prognosis for POC: [x] Good [] Fair  [] Poor    Patient requires continued skilled intervention: [x] Yes  [] No        PLAN: See eval  [x] Continue per plan of care [] Alter current plan (see comments)  [] Plan of care initiated [] Hold pending MD visit [] Discharge    Electronically signed by: Jm Velez PT    Note: If patient does not return for scheduled/recommended follow up visits, this note will serve as a discharge from care along with the most recent update on progress.

## 2023-02-02 ENCOUNTER — HOSPITAL ENCOUNTER (OUTPATIENT)
Dept: PHYSICAL THERAPY | Age: 88
Setting detail: THERAPIES SERIES
Discharge: HOME | End: 2023-02-02
Payer: MEDICARE

## 2023-02-02 PROCEDURE — 97110 THERAPEUTIC EXERCISES: CPT

## 2023-02-02 PROCEDURE — 97116 GAIT TRAINING THERAPY: CPT

## 2023-02-02 PROCEDURE — 97112 NEUROMUSCULAR REEDUCATION: CPT

## 2023-02-02 NOTE — FLOWSHEET NOTE
[facility]  [address] [place] [address] [address], [address]  Phone: [phone]   Fax: [phone]    Physical Therapy Treatment Note/ Progress Report:     Date:  2023    Patient Name:  Elisabeth Forte    :  3/28/1934  MRN: [id number]    Medical/Treatment Diagnosis Information:  Diagnosis: R26.81 unsteady gait  Treatment Diagnosis: R26.81 unsteady gait  Insurance/Certification information:  PT Insurance Information: Aetna Medicare  Physician Information:  Andres Lawton MD   Plan of care signed:    [x] Yes  [] No    Date of Patient follow up with Physician:      Progress Report: []  Yes  [x]  No     Date Range for reporting period:  Beginnin23  Ending:      Progress report due (10 Rx/or 30 days whichever is less): 41     Recertification due (POC duration/ or 90 days whichever is less):      Visit # Insurance Allowable Auth Needed     []Yes   [x]No     RESTRICTIONS/PRECAUTIONS: high falls risk/osteoporosis  Latex Allergy:  [x]NO      []YES  Preferred Language for Healthcare:   [x]English       []other:  Functional Scale: ABC confidence scale: 10-11% confident    Pain level:  0/10     SUBJECTIVE:  offers no c/o's    OBJECTIVE: See eval  Observation: flexed posture,shuffling gait and decreased step height,decreased safe walker distance,poor functional speed,  Test measurements:      Exercises/Interventions: Exercises in bold performed in department today. Items not bolded are carried forward from prior visits for continuity of the record.     Exercise/Equipment Resistance/Repetitions HEP Other comments   bridge 15 []    LAQ 3 sec hold x 10 []    HS 15 [] Slide sheet   AS 15 min/mod [] Slide sheet   SAQ 3 sec hold, x 15 []    Standing heel/toe raises 10 []    Standing march 10 []    Standing abduction 10 []    Standing calf stretch 30 sec x 2 []    STS X x10 cues for components/wt shift [] Elevated surface 21\"   NUSTEP L5x 7min []    Standing Shoulder ext X15, yellow tband []    Standing erect w/o walker@ bed side 30 sec []      []    Gt training w/RW Upright posture,safe walker distance,step height/length and continuous gt sequence in hallway x 100 [] Halima to control rollator distance     []      []      []      Home Exercise Program:  Access Code: [email or web address]  URL: [email or web address]  Date: 01/31/2023  Prepared by: Mortimer Cork    Exercises  Standing Bilateral Gastroc Stretch with Step - 1 x daily - 7 x weekly - 1 sets - 15 reps  Standing March with Counter Support - 1 x daily - 7 x weekly - 1 sets - 15 reps  Standing Hip Abduction with Counter Support - 1 x daily - 7 x weekly - 1 sets - 15 reps  Heel Raises with Counter Support - 1 x daily - 7 x weekly - 1 sets - 15 reps  Supine Bridge - 1 x daily - 7 x weekly - 1 sets - 15 reps  Supine Heel Slide - 1 x daily - 7 x weekly - 1 sets - 15 reps  Supine Hip Abduction - 1 x daily - 7 x weekly - 1 sets - 15 reps  Small Range Straight Leg Raise - 1 x daily - 7 x weekly - 1 sets - 15 reps  Seated Long Arc Quad - 1 x daily - 7 x weekly - 1 sets - 15 reps        Therapeutic Exercise:   [x] (09202) Provided verbal/tactile cueing for activities related to strengthening, flexibility, endurance, ROM for improvements in LE, proximal hip, and core control with self-care, mobility, lifting, ambulation. per list x 15 min    NMR:  [x] (84429) Provided verbal/tactile cueing for activities related to improving balance, coordination, kinesthetic sense, posture, motor skill, proprioception to assist with LE, proximal hip, and core control in self-care, mobility, lifting, ambulation and eccentric single leg control.  Per list x 15 min    Therapeutic Activities:    [] (29016) Provided verbal/tactile cueing for activities related to improving balance, coordination, kinesthetic sense, posture, motor skill, proprioception and motor activation to allow for proper function of core, proximal hip and LE with self-care and ADLs and functional mobility. Gait Training:    [x] (59576) Provided training and instruction to the patient for proper LE, core and proximal hip recruitment and positioning and eccentric body weight control with ambulation re-education including training in upright postural control, step length and height, walker safety and safe walker distances and continuous gt sequencing x 16 min    Manual Treatments:  PROM / STM / Oscillations-Mobs:  G-I, II, III, IV (PA's, Inf., Post.)  [] (49750) Provided manual therapy to mobilize LE, proximal hip and/or LS spine soft tissue/joints for the purpose of modulating pain, promoting relaxation,  increasing ROM, reducing/eliminating soft tissue swelling/inflammation/restriction, improving soft tissue extensibility and allowing for proper ROM for normal function with self-care, mobility, lifting and ambulation.      Home Exercise Program:    [x] (05824) Reviewed/Progressed HEP activities related to strengthening, flexibility, endurance, ROM of core, proximal hip and LE for functional self-care, mobility, lifting and ambulation/stair navigation   [] (81811) Reviewed/Progressed HEP activities related to improving balance, coordination, kinesthetic sense, posture, motor skill, proprioception of core, proximal hip and LE for self-care, mobility, lifting, and ambulation/stair navigation      Modalities:    [] Electric Stimulation:   [] Ultrasound:   [] Other:       Charges:  Timed Code Treatment Minutes: TE x15, gt x16, NMR x 15   Total Treatment Minutes: 46 min      [] EVAL (LOW) 15411 (typically 20 minutes face-to-face)  [] EVAL (MOD) 30236 (typically 30 minutes face-to-face)  [] EVAL (HIGH) 25227 (typically 45 minutes face-to-face)  [] RE-EVAL     [x] SH(81833) x  1     [x] NMR (16983) x  1     [] Manual (12200) x       [] TA (68345) x 1      [x] Gait Training [phone] x 1     [] ES(attended) (52377)  [] ES (un) (80429)   [] DRY NEEDLE 1 OR 2 MUSCLES  [] DRY NEEDLE 3+ MUSCLES  [] [facility] Traction (30799)  [] Ultrasound (67403)  [] Other:      GOALS:   Patient stated goal: \" walk better and be I with shower\"  [] Progressing: [] Met: [] Not Met: [] Adjusted     Therapist goals for Patient:   Short Term Goals: To be achieved in: 4 weeks  1. Pt/cg will demonstrate/verbalize I knowledge of home safety awareness/falls risk prevention with use of AD   [] Progressing: [] Met: [] Not Met: [] Adjusted  2. Patient will improve LE flexibility throughout hamstrings/heelcords and hip flexors to enable pt to assume upright posture in standing  [] Progressing: [] Met: [] Not Met: [] Adjusted     Long Term Goals: To be achieved in: 8 weeks  1. Disability index score of 50% on the confidence scale  to assist with reaching prior level of function with decreased fear of falling  [] Progressing: [] Met: [] Not Met: [] Adjusted  2. Patient will demonstrate improvement in thirty second STS test from 4 to 7    [] Progressing: [] Met: [] Not Met: [] Adjusted  3. Patient will demonstrate an increase in Strength to 4/5 throughout B LE's for good balance reactions,gait components, and to maintain upright postural control with ambulating functional distances throughout home and community with LRAD  [] Progressing: [] Met: [] Not Met: [] Adjusted  4. Patient will improve TUG score from 36 sec to 28 sec to decrease risk of falls  [] Progressing: [] Met: [] Not Met: [] Adjusted  5. Patient/ to demonstrate I HEP with written instructions     [] Progressing: [] Met: [] Not Met: [] Adjusted    ASSESSMENT: Pt is  88 Y.O  female, presenting with c/o \"fear of falling\" with unsteady gait, poor postural control and decreased safety awareness with use of rollator.  Pt continues with flexed posture and shuffling gait sequence with good tolerance to TE and gt training provided this visit.  Pt with improved walker control and upright posture using RW vs rollator with  stating he was going to purchase a RW. Pt requires  constant/specific cueing due to cognitive deficits. Pt will benefit from cont PT to  promote return to highest level of functional independence and safety. Treatment/Activity Tolerance:  [x] Patient tolerated treatment well [] Patient limited by fatique  [] Patient limited by pain  [] Patient limited by other medical complications  [] Other:     Overall Progression Towards Functional goals/ Treatment Progress Update:  [x] Patient is progressing as expected towards functional goals listed. [] Progression is slowed due to complexities/Impairments listed. [] Progression has been slowed due to co-morbidities. [] Plan just implemented, too soon to assess goals progression <30days   [] Goals require adjustment due to lack of progress  [] Patient is not progressing as expected and requires additional follow up with physician  [] Other    Prognosis for POC: [x] Good [] Fair  [] Poor    Patient requires continued skilled intervention: [x] Yes  [] No        PLAN: See eval  [x] Continue per plan of care [] Alter current plan (see comments)  [] Plan of care initiated [] Hold pending MD visit [] Discharge    Electronically signed by: Maddie Perez, PT    Note: If patient does not return for scheduled/recommended follow up visits, this note will serve as a discharge from care along with the most recent update on progress.

## 2023-02-07 ENCOUNTER — HOSPITAL ENCOUNTER (OUTPATIENT)
Dept: PHYSICAL THERAPY | Age: 88
Setting detail: THERAPIES SERIES
Discharge: HOME | End: 2023-02-07
Payer: MEDICARE

## 2023-02-07 PROCEDURE — 97112 NEUROMUSCULAR REEDUCATION: CPT

## 2023-02-07 PROCEDURE — 97110 THERAPEUTIC EXERCISES: CPT

## 2023-02-07 PROCEDURE — 97116 GAIT TRAINING THERAPY: CPT

## 2023-02-07 NOTE — FLOWSHEET NOTE
[facility] Outpatient Therapy  [address] [place] [address] [address], [address]  Phone: [phone]   Fax: [phone]    Physical Therapy Treatment Note/ Progress Report:     Date:  2023    Patient Name:  Vee Mercado    :  3/28/1934  MRN: [id number]    Medical/Treatment Diagnosis Information:  Diagnosis: R26.81 unsteady gait  Treatment Diagnosis: R26.81 unsteady gait  Insurance/Certification information:  PT Insurance Information: Aetna Medicare  Physician Information:  Larry Scott MD   Plan of care signed:    [x] Yes  [] No    Date of Patient follow up with Physician:      Progress Report: []  Yes  [x]  No     Date Range for reporting period:  Beginnin23  Ending:      Progress report due (10 Rx/or 30 days whichever is less):      Recertification due (POC duration/ or 90 days whichever is less):      Visit # Insurance Allowable Auth Needed     []Yes   [x]No     RESTRICTIONS/PRECAUTIONS: high falls risk/osteoporosis  Latex Allergy:  [x]NO      []YES  Preferred Language for Healthcare:   [x]English       []other:  Functional Scale: ABC confidence scale: 10-11% confident    Pain level:  0/10     SUBJECTIVE:  offers no c/o's, \"I enjoy this\"    OBJECTIVE: See eval  Observation: flexed posture,shuffling gait and decreased step height,decreased safe walker distance,poor functional speed,  Test measurements:      Exercises/Interventions: Exercises in bold performed in department today. Items not bolded are carried forward from prior visits for continuity of the record.     Exercise/Equipment Resistance/Repetitions HEP Other comments   bridge 15 []    LAQ 3 sec hold x 10 []    HS 15 [] Slide sheet   AS 15 min/mod [] Slide sheet   SAQ 3 sec hold, x 15 []    Standing heel/toe raises 10 []    Standing march 10 []    Standing abduction 10 []    Standing calf stretch on incline 30 sec x 2 []    STS X x10 cues for components/wt shift [] Elevated surface 21\"   NUSTEP L5x 7min []    Standing Shoulder ext X15, yellow tband []    Standing erect without UE support in // bars 30 sec x 3 []      []    Gt training w/RW Upright posture,safe walker distance,step height/length and continuous gt sequence in hallway x 100 [] Halima to control rollator distance   Lateral step ups 1\" block x 10 each [] // bars   Step overs 1\" block x 10 ea [] // bars     []      Home Exercise Program:  Access Code: [email or web address]  URL: [email or web address]  Date: 01/31/2023  Prepared by: [facility]    Exercises  Standing Bilateral Gastroc Stretch with Step - 1 x daily - 7 x weekly - 1 sets - 15 reps  Standing March with Counter Support - 1 x daily - 7 x weekly - 1 sets - 15 reps  Standing Hip Abduction with Counter Support - 1 x daily - 7 x weekly - 1 sets - 15 reps  Heel Raises with Counter Support - 1 x daily - 7 x weekly - 1 sets - 15 reps  Supine Bridge - 1 x daily - 7 x weekly - 1 sets - 15 reps  Supine Heel Slide - 1 x daily - 7 x weekly - 1 sets - 15 reps  Supine Hip Abduction - 1 x daily - 7 x weekly - 1 sets - 15 reps  Small Range Straight Leg Raise - 1 x daily - 7 x weekly - 1 sets - 15 reps  Seated Long Arc Quad - 1 x daily - 7 x weekly - 1 sets - 15 reps        Therapeutic Exercise:   [x] (80846) Provided verbal/tactile cueing for activities related to strengthening, flexibility, endurance, ROM for improvements in LE, proximal hip, and core control with self-care, mobility, lifting, ambulation. per list x 20 min    NMR:  [] (14051) Provided verbal/tactile cueing for activities related to improving balance, coordination, kinesthetic sense, posture, motor skill, proprioception to assist with LE, proximal hip, and core control in self-care, mobility, lifting, ambulation and eccentric single leg control.      Therapeutic Activities:    [] (15327) Provided verbal/tactile cueing for activities related to improving balance, coordination, kinesthetic sense, posture, motor skill, proprioception and motor activation to allow for proper function of core, proximal hip and LE with self-care and ADLs and functional mobility. Gait Training:    [x] (48355) Provided training and instruction to the patient for proper LE, core and proximal hip recruitment and positioning and eccentric body weight control with ambulation re-education including training in upright postural control, step length and height, walker safety and safe walker distances and continuous gt sequencing x 13 min    Manual Treatments:  PROM / STM / Oscillations-Mobs:  G-I, II, III, IV (PA's, Inf., Post.)  [] (20889) Provided manual therapy to mobilize LE, proximal hip and/or LS spine soft tissue/joints for the purpose of modulating pain, promoting relaxation,  increasing ROM, reducing/eliminating soft tissue swelling/inflammation/restriction, improving soft tissue extensibility and allowing for proper ROM for normal function with self-care, mobility, lifting and ambulation.      Home Exercise Program:    [x] (63159) Reviewed/Progressed HEP activities related to strengthening, flexibility, endurance, ROM of core, proximal hip and LE for functional self-care, mobility, lifting and ambulation/stair navigation   [] (15665) Reviewed/Progressed HEP activities related to improving balance, coordination, kinesthetic sense, posture, motor skill, proprioception of core, proximal hip and LE for self-care, mobility, lifting, and ambulation/stair navigation      Modalities:    [] Electric Stimulation:   [] Ultrasound:   [] Other:       Charges:  Timed Code Treatment Minutes: TE x 20 , gt x13,    Total Treatment Minutes: 33 min      [] EVAL (LOW) 74530 (typically 20 minutes face-to-face)  [] EVAL (MOD) 07871 (typically 30 minutes face-to-face)  [] EVAL (HIGH) 51850 (typically 45 minutes face-to-face)  [] RE-EVAL     [x] QA(61661) x  1     [] NMR (46489) x       [] Manual (32896) x       [] TA (82849) x 1      [x] Gait Training [phone] x 1     [] ES(attended) (60404)  [] ES (un) (94817)   [] DRY NEEDLE 1 OR 2 MUSCLES  [] DRY NEEDLE 3+ MUSCLES  [] [facility] T[facility] (25783)  [] Ultrasound (86989)  [] Other:      GOALS:   Patient stated goal: \" walk better and be I with shower\"  [] Progressing: [] Met: [] Not Met: [] Adjusted     Therapist goals for Patient:   Short Term Goals: To be achieved in: 4 weeks  1. Pt/cg will demonstrate/verbalize I knowledge of home safety awareness/falls risk prevention with use of AD   [] Progressing: [] Met: [] Not Met: [] Adjusted  2. Patient will improve LE flexibility throughout hamstrings/heelcords and hip flexors to enable pt to assume upright posture in standing  [] Progressing: [] Met: [] Not Met: [] Adjusted     Long Term Goals: To be achieved in: 8 weeks  1. Disability index score of 50% on the confidence scale  to assist with reaching prior level of function with decreased fear of falling  [] Progressing: [] Met: [] Not Met: [] Adjusted  2. Patient will demonstrate improvement in thirty second STS test from 4 to 7    [] Progressing: [] Met: [] Not Met: [] Adjusted  3. Patient will demonstrate an increase in Strength to 4/5 throughout B LE's for good balance reactions,gait components, and to maintain upright postural control with ambulating functional distances throughout home and community with LRAD  [] Progressing: [] Met: [] Not Met: [] Adjusted  4. Patient will improve TUG score from 36 sec to 28 sec to decrease risk of falls  [] Progressing: [] Met: [] Not Met: [] Adjusted  5. Patient/ to demonstrate I HEP with written instructions     [] Progressing: [] Met: [] Not Met: [] Adjusted    ASSESSMENT: Pt is  80 Y. O  female, presenting with c/o \"fear of falling\" with unsteady gait, poor postural control and decreased safety awareness with use of rollator. Pt continues with flexed posture,increased walker distance with decreased safety, and shuffling gait sequence with use of rollator.  Pt with improved walker control, step length, continuous gt sequencing and upright posture using RW vs rollator. Pt tolerated progression of TE with step ups/overs with moderate difficulty but good progress seen throughout visit. Pt requires constant/specific cueing throughout visit due to cognitive deficits. Pt will benefit from cont PT to  promote return to highest level of functional independence and safety. Treatment/Activity Tolerance:  [x] Patient tolerated treatment well [] Patient limited by fatique  [] Patient limited by pain  [] Patient limited by other medical complications  [] Other:     Overall Progression Towards Functional goals/ Treatment Progress Update:  [x] Patient is progressing as expected towards functional goals listed. [] Progression is slowed due to complexities/Impairments listed. [] Progression has been slowed due to co-morbidities. [] Plan just implemented, too soon to assess goals progression <30days   [] Goals require adjustment due to lack of progress  [] Patient is not progressing as expected and requires additional follow up with physician  [] Other    Prognosis for POC: [x] Good [] Fair  [] Poor    Patient requires continued skilled intervention: [x] Yes  [] No        PLAN: See eval  [x] Continue per plan of care [] Alter current plan (see comments)  [] Plan of care initiated [] Hold pending MD visit [] Discharge    Electronically signed by: Marty Unger PT    Note: If patient does not return for scheduled/recommended follow up visits, this note will serve as a discharge from care along with the most recent update on progress.

## 2023-02-09 ENCOUNTER — HOSPITAL ENCOUNTER (OUTPATIENT)
Dept: PHYSICAL THERAPY | Age: 88
Setting detail: THERAPIES SERIES
Discharge: HOME | End: 2023-02-09
Payer: MEDICARE

## 2023-02-09 PROCEDURE — 97112 NEUROMUSCULAR REEDUCATION: CPT

## 2023-02-09 PROCEDURE — 97116 GAIT TRAINING THERAPY: CPT

## 2023-02-09 PROCEDURE — 97110 THERAPEUTIC EXERCISES: CPT

## 2023-02-09 NOTE — FLOWSHEET NOTE
[facility] Outpatient Therapy  [address], [address] [place] [address]  Phone: [phone]   Fax: [phone]    Physical Therapy Treatment Note/ Progress Report:     Date:  2023    Patient Name:  Julieth Palmer    :  3/28/1934  MRN: [id number]    Medical/Treatment Diagnosis Information:  Diagnosis: R26.81 unsteady gait  Treatment Diagnosis: R26.81 unsteady gait  Insurance/Certification information:  PT Insurance Information: Aetna Medicare  Physician Information:  Luisa Jain MD   Plan of care signed:    [x] Yes  [] No    Date of Patient follow up with Physician:      Progress Report: []  Yes  [x]  No     Date Range for reporting period:  Beginnin23  Ending:      Progress report due (10 Rx/or 30 days whichever is less): 35     Recertification due (POC duration/ or 90 days whichever is less):      Visit # Insurance Allowable Auth Needed     []Yes   [x]No     RESTRICTIONS/PRECAUTIONS: high falls risk/osteoporosis  Latex Allergy:  [x]NO      []YES  Preferred Language for Healthcare:   [x]English       []other:  Functional Scale: ABC confidence scale: 10-11% confident    Pain level:  0/10     SUBJECTIVE:  offers no c/o's, \"I enjoy this\"    OBJECTIVE: See eval  Observation: flexed posture,shuffling gait and decreased step height,decreased safe walker distance,poor functional speed,  Test measurements:      Exercises/Interventions: Exercises in bold performed in department today. Items not bolded are carried forward from prior visits for continuity of the record.     Exercise/Equipment Resistance/Repetitions HEP Other comments   bridge 15 []    LAQ 3 sec hold x 10 []    HS 15 [] Slide sheet   AS 15 min/mod [] Slide sheet   SAQ 3 sec hold, x 15 []    Standing heel/toe raises 10 []    Standing march 10 []    Standing abduction 10 []    Standing calf stretch on incline 30 sec x 2 []    Chair squats at sink  x10  []    NUSTEP L5x 5 min []    Standing Shoulder ext X15, yellow tband []      []    Side stepping in hallway 30' []    Gt training w/ rollator carpeted surface Upright posture,safe walker distance,step height/length and continuous gt sequence in hallway x 100 [] Halima to control rollator distance   Lateral step ups 1\" block x 10 each [] // bars   Step overs 1\" block x 10 ea [] // bars   Step ups on bottom step B HR 10ea []      Home Exercise Program:  Access Code: [email or web address]  URL: [email or web address]  Date: 01/31/2023  Prepared by: Pieter Giraldo    Exercises  Standing Bilateral Gastroc Stretch with Step - 1 x daily - 7 x weekly - 1 sets - 15 reps  Standing March with Counter Support - 1 x daily - 7 x weekly - 1 sets - 15 reps  Standing Hip Abduction with Counter Support - 1 x daily - 7 x weekly - 1 sets - 15 reps  Heel Raises with Counter Support - 1 x daily - 7 x weekly - 1 sets - 15 reps  Supine Bridge - 1 x daily - 7 x weekly - 1 sets - 15 reps  Supine Heel Slide - 1 x daily - 7 x weekly - 1 sets - 15 reps  Supine Hip Abduction - 1 x daily - 7 x weekly - 1 sets - 15 reps  Small Range Straight Leg Raise - 1 x daily - 7 x weekly - 1 sets - 15 reps  Seated Long Arc Quad - 1 x daily - 7 x weekly - 1 sets - 15 reps        Therapeutic Exercise:   [x] (33644) Provided verbal/tactile cueing for activities related to strengthening, flexibility, endurance, ROM for improvements in LE, proximal hip, and core control with self-care, mobility, lifting, ambulation. per list  x 16 min    NMR:  [] (85962) Provided verbal/tactile cueing for activities related to improving balance, coordination, kinesthetic sense, posture, motor skill, proprioception to assist with LE, proximal hip, and core control in self-care, mobility, lifting, ambulation and eccentric single leg control.  Per list x 15    Therapeutic Activities:    [] (63227) Provided verbal/tactile cueing for activities related to improving balance, coordination, kinesthetic sense, posture, motor skill, proprioception and motor activation to allow for proper function of core, proximal hip and LE with self-care and ADLs and functional mobility. Gait Training:    [x] (61898) Provided training and instruction to the patient for proper LE, core and proximal hip recruitment and positioning and eccentric body weight control with ambulation re-education including training in upright postural control, step length and height, walker safety and safe walker distances and continuous gt sequencing x 16 min    Manual Treatments:  PROM / STM / Oscillations-Mobs:  G-I, II, III, IV (PA's, Inf., Post.)  [] (85858) Provided manual therapy to mobilize LE, proximal hip and/or LS spine soft tissue/joints for the purpose of modulating pain, promoting relaxation,  increasing ROM, reducing/eliminating soft tissue swelling/inflammation/restriction, improving soft tissue extensibility and allowing for proper ROM for normal function with self-care, mobility, lifting and ambulation.      Home Exercise Program:    [x] (62392) Reviewed/Progressed HEP activities related to strengthening, flexibility, endurance, ROM of core, proximal hip and LE for functional self-care, mobility, lifting and ambulation/stair navigation   [] (28143) Reviewed/Progressed HEP activities related to improving balance, coordination, kinesthetic sense, posture, motor skill, proprioception of core, proximal hip and LE for self-care, mobility, lifting, and ambulation/stair navigation      Modalities:    [] Electric Stimulation:   [] Ultrasound:   [] Other:       Charges:  Timed Code Treatment Minutes: TE x 16 , gt x16, NMR x 15   Total Treatment Minutes: 47 min      [] EVAL (LOW) 63584 (typically 20 minutes face-to-face)  [] EVAL (MOD) 36348 (typically 30 minutes face-to-face)  [] EVAL (HIGH) 16804 (typically 45 minutes face-to-face)  [] RE-EVAL     [x] LD(11105) x  1     [x] NMR (77168) x 1      [] Manual (41834) x       [] TA (74423) x 1      [x] Gait Training [phone] x 1     [] ES(attended) (39245)  [] ES (un) (85052)   [] DRY NEEDLE 1 OR 2 MUSCLES  [] DRY NEEDLE 3+ MUSCLES  [] [facility] Traction (37904)  [] Ultrasound (42134)  [] Other:      GOALS:   Patient stated goal: \" walk better and be I with shower\"  [] Progressing: [] Met: [] Not Met: [] Adjusted     Therapist goals for Patient:   Short Term Goals: To be achieved in: 4 weeks  1. Pt/cg will demonstrate/verbalize I knowledge of home safety awareness/falls risk prevention with use of AD   [] Progressing: [] Met: [] Not Met: [] Adjusted  2. Patient will improve LE flexibility throughout hamstrings/heelcords and hip flexors to enable pt to assume upright posture in standing  [] Progressing: [] Met: [] Not Met: [] Adjusted     Long Term Goals: To be achieved in: 8 weeks  1. Disability index score of 50% on the confidence scale  to assist with reaching prior level of function with decreased fear of falling  [] Progressing: [] Met: [] Not Met: [] Adjusted  2. Patient will demonstrate improvement in thirty second STS test from 4 to 7    [] Progressing: [] Met: [] Not Met: [] Adjusted  3. Patient will demonstrate an increase in Strength to 4/5 throughout B LE's for good balance reactions,gait components, and to maintain upright postural control with ambulating functional distances throughout home and community with LRAD  [] Progressing: [] Met: [] Not Met: [] Adjusted  4. Patient will improve TUG score from 36 sec to 28 sec to decrease risk of falls  [] Progressing: [] Met: [] Not Met: [] Adjusted  5. Patient/ to demonstrate I HEP with written instructions     [] Progressing: [] Met: [] Not Met: [] Adjusted    ASSESSMENT: Pt is  80 Y. O  female, presenting with c/o \"fear of falling\" with unsteady gait, poor postural control and decreased safety awareness with use of rollator. Pt continues to require cueing for flexed posture,increased walker distance with decreased safety, and shuffling gait sequence with use of rollator.  . Pt is demonstrating good tolerance to therapy. Pt requires constant/specific cueing throughout visit due to cognitive deficits. Pt will benefit from cont PT to  promote return to highest level of functional independence and safety. Treatment/Activity Tolerance:  [x] Patient tolerated treatment well [] Patient limited by fatique  [] Patient limited by pain  [] Patient limited by other medical complications  [] Other:     Overall Progression Towards Functional goals/ Treatment Progress Update:  [x] Patient is progressing as expected towards functional goals listed. [] Progression is slowed due to complexities/Impairments listed. [] Progression has been slowed due to co-morbidities. [] Plan just implemented, too soon to assess goals progression <30days   [] Goals require adjustment due to lack of progress  [] Patient is not progressing as expected and requires additional follow up with physician  [] Other    Prognosis for POC: [x] Good [] Fair  [] Poor    Patient requires continued skilled intervention: [x] Yes  [] No        PLAN: See eval  [x] Continue per plan of care [] Alter current plan (see comments)  [] Plan of care initiated [] Hold pending MD visit [] Discharge    Electronically signed by: Hanh President, PT    Note: If patient does not return for scheduled/recommended follow up visits, this note will serve as a discharge from care along with the most recent update on progress.

## 2023-02-14 ENCOUNTER — HOSPITAL ENCOUNTER (OUTPATIENT)
Dept: PHYSICAL THERAPY | Age: 88
Setting detail: THERAPIES SERIES
Discharge: HOME | End: 2023-02-14
Payer: MEDICARE

## 2023-02-14 PROCEDURE — 97116 GAIT TRAINING THERAPY: CPT

## 2023-02-14 PROCEDURE — 97110 THERAPEUTIC EXERCISES: CPT

## 2023-02-14 PROCEDURE — 97112 NEUROMUSCULAR REEDUCATION: CPT

## 2023-02-14 NOTE — PROGRESS NOTES
[facility] Outpatient Therapy  [address] [place] [address],  [address] 37, [address]  Phone: [phone]   Fax: [phone]    Physical Therapy Treatment Note/ Progress Report:     Date:  2023    Patient Name:  Mikal Melendrez    :  3/28/1934  MRN: [id number]    Medical/Treatment Diagnosis Information:  Diagnosis: R26.81 unsteady gait  Treatment Diagnosis: R26.81 unsteady gait  Insurance/Certification information:  PT Insurance Information: [facility] [place] [facility]  Physician Information:  Lolis Quijano MD   Plan of care signed:    [x] Yes  [] No    Date of Patient follow up with Physician:      Progress Report: [x]  Yes  []  No     Date Range for reporting period:  Beginnin23  Endin23 (8 visits)    Progress report due (10 Rx/or 30 days whichever is less):      Recertification due (POC duration/ or 90 days whichever is less):      Visit # Insurance Allowable Auth Needed     []Yes   [x]No     RESTRICTIONS/PRECAUTIONS: high falls risk/osteoporosis  Latex Allergy:  [x]NO      []YES  Preferred Language for Healthcare:   [x]English       []other:    Functional Scale: ABC confidence scale: 10-11% confident  Functional score not completed due to  not present to assist pt and pt with cognitive deficits    Pain level:  0/10     SUBJECTIVE:  reports \"I enjoy this so much\"    OBJECTIVE: See eval  Observation: flexed posture,shuffling gait and decreased step height,decreased safe walker distance,poor functional speed,  Test measurements: hip flex improved to 3+ B, hip extension remains 3-, abduction improved from 1/5 to 3-/5, knee extension lag remains in standing but able to get full passive knee extension  HS L improved from -45 to -35, R HS to -40  TUG: continues 40sec,38 sec, 36 sec with use of rollator  Thirty sec sit to stand (with use of hands) increased from 4 to 6      Exercises/Interventions: Exercises in bold performed in department today.   Items not bolded are carried forward from prior visits for continuity of the record. Exercise/Equipment Resistance/Repetitions HEP Other comments   bridge 15 []    LAQ 3 sec hold x 10 []    HS 15 [] Slide sheet   AS 15 min/mod [] Slide sheet   SAQ 3 sec hold, x 15 []    Standing heel/toe raises 10 []    Standing march 10 []    Standing abduction 10 []    Standing calf stretch on incline 30 sec x 2 []    Chair squats at sink  x10  []    NUSTEP L5x 9 min []    Standing Shoulder ext X15, yellow tband []      []    Side stepping in hallway 30' []    Gt training w/ rollator even tile surfaces Upright posture,safe walker distance,step height/length and continuous gt sequence, turns L/R [] Halima to control rollator distance   Lateral step ups 1\" block x 10 each [] // bars   Step overs 1\" block x 10 ea [] // bars   Step ups on bottom step B HR 10ea []      Home Exercise Program:  Access Code: [email or web address]  URL: [email or web address]  Date: 01/31/2023  Prepared by: Annette Machuca    Exercises  Standing Bilateral Gastroc Stretch with Step - 1 x daily - 7 x weekly - 1 sets - 15 reps  Standing March with Counter Support - 1 x daily - 7 x weekly - 1 sets - 15 reps  Standing Hip Abduction with Counter Support - 1 x daily - 7 x weekly - 1 sets - 15 reps  Heel Raises with Counter Support - 1 x daily - 7 x weekly - 1 sets - 15 reps  Supine Bridge - 1 x daily - 7 x weekly - 1 sets - 15 reps  Supine Heel Slide - 1 x daily - 7 x weekly - 1 sets - 15 reps  Supine Hip Abduction - 1 x daily - 7 x weekly - 1 sets - 15 reps  Small Range Straight Leg Raise - 1 x daily - 7 x weekly - 1 sets - 15 reps  Seated Long Arc Quad - 1 x daily - 7 x weekly - 1 sets - 15 reps        Therapeutic Exercise:   [x] (95417) Provided verbal/tactile cueing for activities related to strengthening, flexibility, endurance, ROM for improvements in LE, proximal hip, and core control with self-care, mobility, lifting, ambulation. per list  x 16 min    NMR:  [] (66174) Provided verbal/tactile cueing for activities related to improving balance, coordination, kinesthetic sense, posture, motor skill, proprioception to assist with LE, proximal hip, and core control in self-care, mobility, lifting, ambulation and eccentric single leg control. Per list x 15    Therapeutic Activities:    [] (86294) Provided verbal/tactile cueing for activities related to improving balance, coordination, kinesthetic sense, posture, motor skill, proprioception and motor activation to allow for proper function of core, proximal hip and LE with self-care and ADLs and functional mobility. Gait Training:    [] (32254) Provided training and instruction to the patient for proper LE, core and proximal hip recruitment and positioning and eccentric body weight control with ambulation re-education including per list x 15 min    Manual Treatments:  PROM / STM / Oscillations-Mobs:  G-I, II, III, IV (PA's, Inf., Post.)  [] (79838) Provided manual therapy to mobilize LE, proximal hip and/or LS spine soft tissue/joints for the purpose of modulating pain, promoting relaxation,  increasing ROM, reducing/eliminating soft tissue swelling/inflammation/restriction, improving soft tissue extensibility and allowing for proper ROM for normal function with self-care, mobility, lifting and ambulation.      Home Exercise Program:    [x] (29526) Reviewed/Progressed HEP activities related to strengthening, flexibility, endurance, ROM of core, proximal hip and LE for functional self-care, mobility, lifting and ambulation/stair navigation   [] (56094) Reviewed/Progressed HEP activities related to improving balance, coordination, kinesthetic sense, posture, motor skill, proprioception of core, proximal hip and LE for self-care, mobility, lifting, and ambulation/stair navigation      Modalities:    [] Electric Stimulation:   [] Ultrasound:   [] Other:       Charges:  Timed Code Treatment Minutes: TE x 16 , gt x15, NMR x 15   Total Treatment Minutes: 46 min      [] EVAL (LOW) 53961 (typically 20 minutes face-to-face)  [] EVAL (MOD) 68823 (typically 30 minutes face-to-face)  [] EVAL (HIGH) 88224 (typically 45 minutes face-to-face)  [] RE-EVAL     [x] PI(74462) x  1     [x] NMR (62713) x 1      [] Manual (16983) x       [] TA (95331) x 1      [x] Gait Training [phone] x  1   [] ES(attended) (86521)  [] ES (un) (94408)   [] DRY NEEDLE 1 OR 2 MUSCLES  [] DRY NEEDLE 3+ MUSCLES  [] Mech Traction (84924)  [] Ultrasound (09582)  [] Other:      GOALS:   Patient stated goal: \" walk better and be I with shower\"  [] Progressing: [] Met: [] Not Met: [] Adjusted     Therapist goals for Patient:   Short Term Goals: To be achieved in: 4 weeks  1. Pt/cg will demonstrate/verbalize I knowledge of home safety awareness/falls risk prevention with use of AD   [] Progressing: [] Met: [] Not Met: [] Adjusted  2. Patient will improve LE flexibility throughout hamstrings/heelcords and hip flexors to enable pt to assume upright posture in standing  [] Progressing: [] Met: [] Not Met: [] Adjusted     Long Term Goals: To be achieved in: 8 weeks  1. Disability index score of 50% on the confidence scale  to assist with reaching prior level of function with decreased fear of falling  [] Progressing: [] Met: [] Not Met: [] Adjusted  2. Patient will demonstrate improvement in thirty second STS test from 4 to 7    [] Progressing: [] Met: [] Not Met: [] Adjusted  3. Patient will demonstrate an increase in Strength to 4/5 throughout B LE's for good balance reactions,gait components, and to maintain upright postural control with ambulating functional distances throughout home and community with LRAD  [] Progressing: [] Met: [] Not Met: [] Adjusted  4. Patient will improve TUG score from 36 sec to 28 sec to decrease risk of falls  [] Progressing: [] Met: [] Not Met: [] Adjusted  5.  Patient/ to demonstrate I HEP with written instructions     [] Progressing: [] Met: [] Not Met: [] Adjusted    ASSESSMENT: Pt is  80 Y. O pleasantly confused female referred to PT due to unsteady gait and generalized weakness. Pt has been seen 8 visits over the past 4 weeks with slow progress being made toward established goals. Pt demonstrates improved strength with TE and improved functional transfers with Thirty sec STS test (see test measurements above). Pt continues to require constant cueing for upright posture and safe walker distances. Pt does improve with postural control and safety with use of RW vs rollator but has not been open to  ordering this as of yet(but is agreeable to use in therapy) Pt is demonstrating good tolerance to therapy and will benefit from cont PT to  promote return to highest level of functional independence and safety. Treatment/Activity Tolerance:  [x] Patient tolerated treatment well [] Patient limited by fatique  [] Patient limited by pain  [] Patient limited by other medical complications  [] Other:     Overall Progression Towards Functional goals/ Treatment Progress Update:  [x] Patient is progressing as expected towards functional goals listed. [] Progression is slowed due to complexities/Impairments listed. [] Progression has been slowed due to co-morbidities.   [] Plan just implemented, too soon to assess goals progression <30days   [] Goals require adjustment due to lack of progress  [] Patient is not progressing as expected and requires additional follow up with physician  [] Other    Prognosis for POC: [x] Good [] Fair  [] Poor    Patient requires continued skilled intervention: [x] Yes  [] No        PLAN: See eval  [x] Continue per plan of care [] Alter current plan (see comments)  [] Plan of care initiated [] Hold pending MD visit [] Discharge    Electronically signed by: Ismael Lobo, PT    Note: If patient does not return for scheduled/recommended follow up visits, this note will serve as a discharge from care along with the most recent update on progress.

## 2023-02-16 ENCOUNTER — HOSPITAL ENCOUNTER (OUTPATIENT)
Dept: PHYSICAL THERAPY | Age: 88
Setting detail: THERAPIES SERIES
Discharge: HOME | End: 2023-02-16
Payer: MEDICARE

## 2023-02-16 PROCEDURE — 97110 THERAPEUTIC EXERCISES: CPT

## 2023-02-16 PROCEDURE — 97112 NEUROMUSCULAR REEDUCATION: CPT

## 2023-02-16 NOTE — FLOWSHEET NOTE
[facility] Outpatient Therapy  [address] [place] [address],  [address] 51, [address]  Phone: [phone]   Fax: [phone]    Physical Therapy Treatment Note/ Progress Report:     Date:  2023    Patient Name:  Harvinder Leslie    :  3/28/1934  MRN: [id number]    Medical/Treatment Diagnosis Information:  Diagnosis: R26.81 unsteady gait  Treatment Diagnosis: R26.81 unsteady gait  Insurance/Certification information:  PT Insurance Information: [facility] [place] [facility] [employer]  Physician Information:  Kelsey Benavides MD   Plan of care signed:    [x] Yes  [] No    Date of Patient follow up with Physician:      Progress Report: []  Yes  [x]  No     Date Range for reporting period:  Beginnin23  Endin23 (8 visits)    Progress report due (10 Rx/or 30 days whichever is less):      Recertification due (POC duration/ or 90 days whichever is less):      Visit # Insurance Allowable Auth Needed     []Yes   [x]No     RESTRICTIONS/PRECAUTIONS: high falls risk/osteoporosis  Latex Allergy:  [x]NO      []YES  Preferred Language for Healthcare:   [x]English       []other:    Functional Scale: ABC confidence scale: 10-11% confident  Functional score not completed due to  not present to assist pt and pt with cognitive deficits    Pain level:  0/10     SUBJECTIVE:  reports \"I'm not lying on the couch all day now, I just love this\"    OBJECTIVE: See eval  Observation: flexed posture,shuffling gait and decreased step height,decreased safe walker distance,poor functional speed,  Test measurements: hip flex improved to 3+ B, hip extension remains 3-, abduction improved from 1/5 to 3-/5, knee extension lag remains in standing but able to get full passive knee extension  HS L improved from -45 to -35, R HS to -40  TUG: continues 40sec,38 sec, 36 sec with use of rollator  Thirty sec sit to stand (with use of hands) increased from 4 to 6      Exercises/Interventions: Exercises in bold performed in department today. Items not bolded are carried forward from prior visits for continuity of the record. Exercise/Equipment Resistance/Repetitions HEP Other comments   Hamstring stretch 30 sec x 3     bridge 15 []    LAQ 3 sec hold x 10 []    HS 15 [] Slide sheet   AS 15 min/mod [] Slide sheet   SAQ 3 sec hold, x 15 []    Standing heel/toe raises 10 []    Standing march 10 []    Standing abduction 10 []    Standing calf stretch on incline 30 sec x 2 []    Chair squats at sink  x10  []    NUSTEP L5x  10 min []    Standing Shoulder ext X15, yellow tband []    Hip flexion stretch Bed side on incline []    Side stepping in hallway 30' []    Gt training w/ rollator even tile surfaces Upright posture,safe walker distance,step height/length and continuous gt sequence, turns L/R [] Halima to control rollator distance   Lateral step ups 1\" block x 10 each [] // bars   Step overs 1\" block x 10 ea [] // bars   Step ups on bottom step B HR 10ea []      Home Exercise Program:  Access Code: [email or web address]  URL: [email or web address]  Date: 01/31/2023  Prepared by: Omkar Cavazos    Exercises  Standing Bilateral Gastroc Stretch with Step - 1 x daily - 7 x weekly - 1 sets - 15 reps  Standing March with Counter Support - 1 x daily - 7 x weekly - 1 sets - 15 reps  Standing Hip Abduction with Counter Support - 1 x daily - 7 x weekly - 1 sets - 15 reps  Heel Raises with Counter Support - 1 x daily - 7 x weekly - 1 sets - 15 reps  Supine Bridge - 1 x daily - 7 x weekly - 1 sets - 15 reps  Supine Heel Slide - 1 x daily - 7 x weekly - 1 sets - 15 reps  Supine Hip Abduction - 1 x daily - 7 x weekly - 1 sets - 15 reps  Small Range Straight Leg Raise - 1 x daily - 7 x weekly - 1 sets - 15 reps  Seated Long Arc Quad - 1 x daily - 7 x weekly - 1 sets - 15 reps        Therapeutic Exercise:   [x] (60899) Provided verbal/tactile cueing for activities related to strengthening, flexibility, endurance, ROM for improvements in LE, proximal hip, and core control with self-care, mobility, lifting, ambulation. per list  x 16 min    NMR:  [] (34167) Provided verbal/tactile cueing for activities related to improving balance, coordination, kinesthetic sense, posture, motor skill, proprioception to assist with LE, proximal hip, and core control in self-care, mobility, lifting, ambulation and eccentric single leg control. Per list x 15    Therapeutic Activities:    [] (10840) Provided verbal/tactile cueing for activities related to improving balance, coordination, kinesthetic sense, posture, motor skill, proprioception and motor activation to allow for proper function of core, proximal hip and LE with self-care and ADLs and functional mobility. Gait Training:    [] (12053) Provided training and instruction to the patient for proper LE, core and proximal hip recruitment and positioning and eccentric body weight control with ambulation re-education including per list x 15 min    Manual Treatments:  PROM / STM / Oscillations-Mobs:  G-I, II, III, IV (PA's, Inf., Post.)  [] (75223) Provided manual therapy to mobilize LE, proximal hip and/or LS spine soft tissue/joints for the purpose of modulating pain, promoting relaxation,  increasing ROM, reducing/eliminating soft tissue swelling/inflammation/restriction, improving soft tissue extensibility and allowing for proper ROM for normal function with self-care, mobility, lifting and ambulation.      Home Exercise Program:    [x] (40590) Reviewed/Progressed HEP activities related to strengthening, flexibility, endurance, ROM of core, proximal hip and LE for functional self-care, mobility, lifting and ambulation/stair navigation   [] (47209) Reviewed/Progressed HEP activities related to improving balance, coordination, kinesthetic sense, posture, motor skill, proprioception of core, proximal hip and LE for self-care, mobility, lifting, and ambulation/stair navigation      Modalities:    [] Electric Stimulation:   [] Ultrasound: [] Other:       Charges:  Timed Code Treatment Minutes: TE x 16 , gt x15, NMR x 15   Total Treatment Minutes: 46 min      [] EVAL (LOW) 24996 (typically 20 minutes face-to-face)  [] EVAL (MOD) 98771 (typically 30 minutes face-to-face)  [] EVAL (HIGH) 79008 (typically 45 minutes face-to-face)  [] RE-EVAL     [x] SM(06753) x  1     [x] NMR (00013) x 1      [] Manual (54721) x       [] TA (00978) x 1      [x] Gait Training [phone] x  1   [] ES(attended) (24478)  [] ES (un) (93084)   [] DRY NEEDLE 1 OR 2 MUSCLES  [] DRY NEEDLE 3+ MUSCLES  [] Mech Traction (94649)  [] Ultrasound (98401)  [] Other:      GOALS:   Patient stated goal: \" walk better and be I with shower\"  [] Progressing: [] Met: [] Not Met: [] Adjusted     Therapist goals for Patient:   Short Term Goals: To be achieved in: 4 weeks  1. Pt/cg will demonstrate/verbalize I knowledge of home safety awareness/falls risk prevention with use of AD   [] Progressing: [] Met: [] Not Met: [] Adjusted  2. Patient will improve LE flexibility throughout hamstrings/heelcords and hip flexors to enable pt to assume upright posture in standing  [] Progressing: [] Met: [] Not Met: [] Adjusted     Long Term Goals: To be achieved in: 8 weeks  1. Disability index score of 50% on the confidence scale  to assist with reaching prior level of function with decreased fear of falling  [] Progressing: [] Met: [] Not Met: [] Adjusted  2. Patient will demonstrate improvement in thirty second STS test from 4 to 7    [] Progressing: [] Met: [] Not Met: [] Adjusted  3. Patient will demonstrate an increase in Strength to 4/5 throughout B LE's for good balance reactions,gait components, and to maintain upright postural control with ambulating functional distances throughout home and community with LRAD  [] Progressing: [] Met: [] Not Met: [] Adjusted  4. Patient will improve TUG score from 36 sec to 28 sec to decrease risk of falls  [] Progressing: [] Met: [] Not Met: [] Adjusted  5. Patient/ to demonstrate I HEP with written instructions     [] Progressing: [] Met: [] Not Met: [] Adjusted    ASSESSMENT: Pt is  80 Y. O pleasantly confused female referred to PT due to unsteady gait and generalized weakness. Pt has been seen 8 visits over the past 4 weeks with slow progress being made toward established goals. Pt demonstrates improved strength with TE and improved functional transfers with Thirty sec STS test (see test measurements above). Pt continues to require constant cueing for upright posture and safe walker distances. Pt does improve with postural control and safety with use of RW vs rollator but has not been open to  ordering this as of yet(but is agreeable to use in therapy) Pt is demonstrating good tolerance to therapy and will benefit from cont PT to  promote return to highest level of functional independence and safety. Treatment/Activity Tolerance:  [x] Patient tolerated treatment well [] Patient limited by fatique  [] Patient limited by pain  [] Patient limited by other medical complications  [] Other:     Overall Progression Towards Functional goals/ Treatment Progress Update:  [x] Patient is progressing as expected towards functional goals listed. [] Progression is slowed due to complexities/Impairments listed. [] Progression has been slowed due to co-morbidities.   [] Plan just implemented, too soon to assess goals progression <30days   [] Goals require adjustment due to lack of progress  [] Patient is not progressing as expected and requires additional follow up with physician  [] Other    Prognosis for POC: [x] Good [] Fair  [] Poor    Patient requires continued skilled intervention: [x] Yes  [] No        PLAN: See eval  [x] Continue per plan of care [] Alter current plan (see comments)  [] Plan of care initiated [] Hold pending MD visit [] Discharge    Electronically signed by: Susana Lau, PT    Note: If patient does not return for scheduled/recommended follow up visits, this note will serve as a discharge from care along with the most recent update on progress.

## 2023-02-22 ENCOUNTER — HOSPITAL ENCOUNTER (OUTPATIENT)
Dept: PHYSICAL THERAPY | Age: 88
Setting detail: THERAPIES SERIES
Discharge: HOME | End: 2023-02-22
Payer: MEDICARE

## 2023-02-22 PROCEDURE — 97116 GAIT TRAINING THERAPY: CPT

## 2023-02-22 PROCEDURE — 97112 NEUROMUSCULAR REEDUCATION: CPT

## 2023-02-22 NOTE — FLOWSHEET NOTE
[facility] Therapy  [address] [place] [address],  L[address] 50, [address]  Phone: [phone]   Fax: [phone]    Physical Therapy Treatment Note/ Progress Report:     Date:  2023    Patient Name:  Sammy Shearer    :  3/28/1934  MRN: [id number]    Medical/Treatment Diagnosis Information:  Diagnosis: R26.81 unsteady gait  Treatment Diagnosis: R26.81 unsteady gait  Insurance/Certification information:  PT Insurance Information: [facility] [place] [facility]  Physician Information:  Ronel Jackman MD   Plan of care signed:    [x] Yes  [] No    Date of Patient follow up with Physician:      Progress Report: []  Yes  [x]  No     Date Range for reporting period:  Beginnin23  Endin23 (8 visits)    Progress report due (10 Rx/or 30 days whichever is less):      Recertification due (POC duration/ or 90 days whichever is less):      Visit # Insurance Allowable Auth Needed     []Yes   [x]No     RESTRICTIONS/PRECAUTIONS: high falls risk/osteoporosis  Latex Allergy:  [x]NO      []YES  Preferred Language for Healthcare:   [x]English       []other:    Functional Scale: ABC confidence scale: 10-11% confident  Functional score not completed due to  not present to assist pt and pt with cognitive deficits    Pain level:  0/10     SUBJECTIVE:  reports \"I'm not lying on the couch all day now, I just love this\"    OBJECTIVE: See eval  Observation: flexed posture,shuffling gait and decreased step height,decreased safe walker distance,poor functional speed,  Test measurements: hip flex improved to 3+ B, hip extension remains 3-, abduction improved from 1/5 to 3-/5, knee extension lag remains in standing but able to get full passive knee extension  HS L improved from -45 to -35, R HS to -40  TUG: continues 40sec,38 sec, 36 sec with use of rollator  Thirty sec sit to stand (with use of hands) increased from 4 to 6      Exercises/Interventions: Exercises in bold performed in department today. Items not bolded are carried forward from prior visits for continuity of the record. Exercise/Equipment Resistance/Repetitions HEP Other comments   Hamstring stretch 30 sec x 3     bridge 15 []    LAQ 3 sec hold x 10 []    HS 15 [] Slide sheet   AS 15 min/mod [] Slide sheet   SAQ 3 sec hold, x 15 []    Standing heel/toe raises 10 []    Standing march 10 []    Standing abduction 10 []    Standing calf stretch on incline 30 sec x 2 []    Chair squats at sink  x10  []    NUSTEP L5x  10 min []    Standing Shoulder ext X15, yellow tband []    Tall kneeling on robin table B UE support on stool, alt arm raises []      []    Gt training w/ FWW even/uneven/tile/carpet and incline ramp outside to car Upright posture,safe walker distance,step height/length and continuous gt sequence, turns L/R []    Lateral step ups 4\"\" block x 10 each [] // bars   Step overs 1\" block x 10 ea [] // bars   Step ups w/forward eccentric lowering and back 2\" block [] // bars     Home Exercise Program:  Access Code: [email or web address]  URL: [email or web address]  Date: 01/31/2023  Prepared by: Paige Morales    Exercises  Standing Bilateral Gastroc Stretch with Step - 1 x daily - 7 x weekly - 1 sets - 15 reps  Standing March with Counter Support - 1 x daily - 7 x weekly - 1 sets - 15 reps  Standing Hip Abduction with Counter Support - 1 x daily - 7 x weekly - 1 sets - 15 reps  Heel Raises with Counter Support - 1 x daily - 7 x weekly - 1 sets - 15 reps  Supine Bridge - 1 x daily - 7 x weekly - 1 sets - 15 reps  Supine Heel Slide - 1 x daily - 7 x weekly - 1 sets - 15 reps  Supine Hip Abduction - 1 x daily - 7 x weekly - 1 sets - 15 reps  Small Range Straight Leg Raise - 1 x daily - 7 x weekly - 1 sets - 15 reps  Seated Long Arc Quad - 1 x daily - 7 x weekly - 1 sets - 15 reps        Therapeutic Exercise:   [x] (36999) Provided verbal/tactile cueing for activities related to strengthening, flexibility, endurance, ROM for improvements in LE, proximal hip, and core control with self-care, mobility, lifting, ambulation    NMR:  [] (12358) Provided verbal/tactile cueing for activities related to improving balance, coordination, kinesthetic sense, posture, motor skill, proprioception to assist with LE, proximal hip, and core control in self-care, mobility, lifting, ambulation and eccentric single leg control. Per list x 32    Therapeutic Activities:    [] (38608) Provided verbal/tactile cueing for activities related to improving balance, coordination, kinesthetic sense, posture, motor skill, proprioception and motor activation to allow for proper function of core, proximal hip and LE with self-care and ADLs and functional mobility. Gait Training:    [] (41367) Provided training and instruction to the patient for proper LE, core and proximal hip recruitment and positioning and eccentric body weight control with ambulation re-education including per list x 25 min    Manual Treatments:  PROM / STM / Oscillations-Mobs:  G-I, II, III, IV (PA's, Inf., Post.)  [] (76822) Provided manual therapy to mobilize LE, proximal hip and/or LS spine soft tissue/joints for the purpose of modulating pain, promoting relaxation,  increasing ROM, reducing/eliminating soft tissue swelling/inflammation/restriction, improving soft tissue extensibility and allowing for proper ROM for normal function with self-care, mobility, lifting and ambulation.      Home Exercise Program:    [x] (23270) Reviewed/Progressed HEP activities related to strengthening, flexibility, endurance, ROM of core, proximal hip and LE for functional self-care, mobility, lifting and ambulation/stair navigation   [] (41689) Reviewed/Progressed HEP activities related to improving balance, coordination, kinesthetic sense, posture, motor skill, proprioception of core, proximal hip and LE for self-care, mobility, lifting, and ambulation/stair navigation      Modalities:    [] Electric Stimulation:   [] Ultrasound:   [] Other:       Charges:  Timed Code Treatment Minutes: gt x 25, NMR x 32   Total Treatment Minutes: 57 min      [] EVAL (LOW) 86168 (typically 20 minutes face-to-face)  [] EVAL (MOD) 28085 (typically 30 minutes face-to-face)  [] EVAL (HIGH) 49174 (typically 45 minutes face-to-face)  [] RE-EVAL     [] TE(36765) x      [x] NMR (69799) x 2     [] Manual (35196) x       [] TA (73157) x 1      [x] Gait Training (30949) x  2   [] ES(attended) (52690)  [] ES (un) (05825)   [] DRY NEEDLE 1 OR 2 MUSCLES  [] DRY NEEDLE 3+ MUSCLES  [] Mech Traction (31288)  [] Ultrasound (34713)  [] Other:      GOALS:   Patient stated goal: \" walk better and be I with shower\"  [] Progressing: [] Met: [] Not Met: [] Adjusted     Therapist goals for Patient:   Short Term Goals: To be achieved in: 4 weeks  1. Pt/cg will demonstrate/verbalize I knowledge of home safety awareness/falls risk prevention with use of AD   [] Progressing: [] Met: [] Not Met: [] Adjusted  2. Patient will improve LE flexibility throughout hamstrings/heelcords and hip flexors to enable pt to assume upright posture in standing  [] Progressing: [] Met: [] Not Met: [] Adjusted     Long Term Goals: To be achieved in: 8 weeks  1. Disability index score of 50% on the confidence scale  to assist with reaching prior level of function with decreased fear of falling  [] Progressing: [] Met: [] Not Met: [] Adjusted  2. Patient will demonstrate improvement in thirty second STS test from 4 to 7    [] Progressing: [] Met: [] Not Met: [] Adjusted  3. Patient will demonstrate an increase in Strength to 4/5 throughout B LE's for good balance reactions,gait components, and to maintain upright postural control with ambulating functional distances throughout home and community with LRAD  [] Progressing: [] Met: [] Not Met: [] Adjusted  4. Patient will improve TUG score from 36 sec to 28 sec to decrease risk of falls  [] Progressing: [] Met: [] Not Met: []  Adjusted  5. Patient/ to demonstrate I HEP with written instructions     [] Progressing: [] Met: [] Not Met: [] Adjusted    ASSESSMENT: Pt is  88 Y.O pleasantly confused female referred to PT due to unsteady gait and generalized weakness.Pt demonstrates good tolerance to therapy and is very motivated with participation during session. She continues to require constant cueing for upright posture and safe walker distances as well as throughout each exercise due to cognitive deficits. Pt does improve with postural control and safe walker distances with use of FWW vs rollator. Negotiated uneven surfaces and ramp well but recommend continued gait training before making decision about which AD is best/safest. Pt is demonstrating slow progress toward established goals with improved strength and upright postural control with functioal ambulation. Pt will benefit from cont PT to  promote return to highest level of functional independence and safety.      Treatment/Activity Tolerance:  [x] Patient tolerated treatment well [] Patient limited by fatique  [] Patient limited by pain  [] Patient limited by other medical complications  [] Other:     Overall Progression Towards Functional goals/ Treatment Progress Update:  [x] Patient is progressing as expected towards functional goals listed.    [] Progression is slowed due to complexities/Impairments listed.  [] Progression has been slowed due to co-morbidities.  [] Plan just implemented, too soon to assess goals progression <30days   [] Goals require adjustment due to lack of progress  [] Patient is not progressing as expected and requires additional follow up with physician  [] Other    Prognosis for POC: [x] Good [] Fair  [] Poor    Patient requires continued skilled intervention: [x] Yes  [] No        PLAN: See eval  [x] Continue per plan of care [] Alter current plan (see comments)  [] Plan of care initiated [] Hold pending MD visit [] Discharge    Electronically  signed by: Elysia Sosa PT    Note: If patient does not return for scheduled/recommended follow up visits, this note will serve as a discharge from care along with the most recent update on progress.

## 2023-02-27 ENCOUNTER — APPOINTMENT (OUTPATIENT)
Dept: PHYSICAL THERAPY | Age: 88
End: 2023-02-27
Payer: MEDICARE

## 2023-02-27 PROCEDURE — 97112 NEUROMUSCULAR REEDUCATION: CPT

## 2023-02-27 PROCEDURE — 97110 THERAPEUTIC EXERCISES: CPT

## 2023-02-27 NOTE — FLOWSHEET NOTE
[facility] Outpatient Therapy  [address] [place] [address], [address], [address]  Phone: [phone]   Fax: [phone]    Physical Therapy Treatment Note/ Progress Report:     Date:  2023    Patient Name:  Chiquita Brito    :  3/28/1934  MRN: [id number]    Medical/Treatment Diagnosis Information:  Diagnosis: R26.81 unsteady gait  Treatment Diagnosis: R26.81 unsteady gait  Insurance/Certification information:  PT Insurance Information: [address] [place] [address]  Medicare  Physician Information:  Allan Ellis MD   Plan of care signed:    [x] Yes  [] No    Date of Patient follow up with Physician:      Progress Report: []  Yes  [x]  No     Date Range for reporting period:  Beginnin23  Endin23 (8 visits)    Progress report due (10 Rx/or 30 days whichever is less): 3/65/24     Recertification due (POC duration/ or 90 days whichever is less):      Visit # Insurance Allowable Auth Needed     []Yes   [x]No     RESTRICTIONS/PRECAUTIONS: high falls risk/osteoporosis  Latex Allergy:  [x]NO      []YES  Preferred Language for Healthcare:   [x]English       []other:    Functional Scale: ABC confidence scale: 10-11% confident  Functional score not completed due to  not present to assist pt and pt with cognitive deficits    Pain level:  0/10     SUBJECTIVE:  reports \"I just love this\", denies pain,soreness or falls    OBJECTIVE: See eval  Observation: flexed posture,shuffling gait and decreased step height,decreased safe walker distance,poor functional speed,  Test measurements: hip flex improved to 3+ B, hip extension remains 3-, abduction improved from 1/5 to 3-/5, knee extension lag remains in standing but able to get full passive knee extension  HS L improved from -45 to -35, R HS to -40  TUG: continues 40sec,38 sec, 36 sec with use of rollator  Thirty sec sit to stand (with use of hands) increased from 4 to 6      Exercises/Interventions: Exercises in bold performed in department today. Items not bolded are carried forward from prior visits for continuity of the record. Exercise/Equipment Resistance/Repetitions HEP Other comments   Hamstring stretch 30 sec x 3     bridge 15 []    LAQ 3 sec hold x 10 []    HS 15 [] Slide sheet   AS 15 min/mod [] Slide sheet   SAQ 3 sec hold, x 15 []    Standing heel/toe raises 10 []    Standing march 10 []    Standing abduction 10 []    Standing calf stretch on block 30 sec x 2 []    STS from chair with single arm assist  x10  []    NUSTEP L7  10 min []    Standing Shoulder ext X15, yellow tband []      []      []     Static balance on Airex 3 min for upright postural control [] B UE assist   Lateral step ups 4\"\" block x 10 each [] // bars   Step overs 1\" block x 10 ea [] // bars   Step ups w/forward eccentric lowering and back 2\" block [] // bars     Home Exercise Program:  Access Code: [email or web address]  URL: [email or web address]  Date: 01/31/2023  Prepared by: Mahamed Templeton    Exercises  Standing Bilateral Gastroc Stretch with Step - 1 x daily - 7 x weekly - 1 sets - 15 reps  Standing March with Counter Support - 1 x daily - 7 x weekly - 1 sets - 15 reps  Standing Hip Abduction with Counter Support - 1 x daily - 7 x weekly - 1 sets - 15 reps  Heel Raises with Counter Support - 1 x daily - 7 x weekly - 1 sets - 15 reps  Supine Bridge - 1 x daily - 7 x weekly - 1 sets - 15 reps  Supine Heel Slide - 1 x daily - 7 x weekly - 1 sets - 15 reps  Supine Hip Abduction - 1 x daily - 7 x weekly - 1 sets - 15 reps  Small Range Straight Leg Raise - 1 x daily - 7 x weekly - 1 sets - 15 reps  Seated Long Arc Quad - 1 x daily - 7 x weekly - 1 sets - 15 reps        Therapeutic Exercise:   [x] (49711) Provided verbal/tactile cueing for activities related to strengthening, flexibility, endurance, ROM for improvements in LE, proximal hip, and core control with self-care, mobility, lifting, ambulation    NMR:  [x] (31715) Provided verbal/tactile cueing for activities related to improving balance, coordination, kinesthetic sense, posture, motor skill, proprioception to assist with LE, proximal hip, and core control in self-care, mobility, lifting, ambulation and eccentric single leg control. Per list x 32    Therapeutic Activities:    [] (97532) Provided verbal/tactile cueing for activities related to improving balance, coordination, kinesthetic sense, posture, motor skill, proprioception and motor activation to allow for proper function of core, proximal hip and LE with self-care and ADLs and functional mobility. Gait Training:    [] (56356) Provided training and instruction to the patient for proper LE, core and proximal hip recruitment and positioning and eccentric body weight control with ambulation re-education including     Manual Treatments:  PROM / STM / Oscillations-Mobs:  G-I, II, III, IV (PA's, Inf., Post.)  [] (15683) Provided manual therapy to mobilize LE, proximal hip and/or LS spine soft tissue/joints for the purpose of modulating pain, promoting relaxation,  increasing ROM, reducing/eliminating soft tissue swelling/inflammation/restriction, improving soft tissue extensibility and allowing for proper ROM for normal function with self-care, mobility, lifting and ambulation.      Home Exercise Program:    [x] (90896) Reviewed/Progressed HEP activities related to strengthening, flexibility, endurance, ROM of core, proximal hip and LE for functional self-care, mobility, lifting and ambulation/stair navigation   [] (88016) Reviewed/Progressed HEP activities related to improving balance, coordination, kinesthetic sense, posture, motor skill, proprioception of core, proximal hip and LE for self-care, mobility, lifting, and ambulation/stair navigation      Modalities:    [] Electric Stimulation:   [] Ultrasound:   [] Other:       Charges:  Timed Code Treatment Minutes: NMR x 17, TE x 26   Total Treatment Minutes:  43min      [] EVAL (LOW) 455 [id number] (typically 20 minutes face-to-face)  [] EVAL (MOD) 21500 (typically 30 minutes face-to-face)  [] EVAL (HIGH) 54800 (typically 45 minutes face-to-face)  [] RE-EVAL     [x] UG(66321) x  2    [x] NMR (13413) x1     [] Manual (16946) x       [] TA (01573) x 1      [] Gait Training (63398)    [] ES(attended) (07909)  [] ES (un) (56536)   [] DRY NEEDLE 1 OR 2 MUSCLES  [] DRY NEEDLE 3+ MUSCLES  [] Mech Traction (11434)  [] Ultrasound (08212)  [] Other:      GOALS:   Patient stated goal: \" walk better and be I with shower\"  [] Progressing: [] Met: [] Not Met: [] Adjusted     Therapist goals for Patient:   Short Term Goals: To be achieved in: 4 weeks  1. Pt/cg will demonstrate/verbalize I knowledge of home safety awareness/falls risk prevention with use of AD   [] Progressing: [] Met: [] Not Met: [] Adjusted  2. Patient will improve LE flexibility throughout hamstrings/heelcords and hip flexors to enable pt to assume upright posture in standing  [] Progressing: [] Met: [] Not Met: [] Adjusted     Long Term Goals: To be achieved in: 8 weeks  1. Disability index score of 50% on the confidence scale  to assist with reaching prior level of function with decreased fear of falling  [] Progressing: [] Met: [] Not Met: [] Adjusted  2. Patient will demonstrate improvement in thirty second STS test from 4 to 7    [] Progressing: [] Met: [] Not Met: [] Adjusted  3. Patient will demonstrate an increase in Strength to 4/5 throughout B LE's for good balance reactions,gait components, and to maintain upright postural control with ambulating functional distances throughout home and community with LRAD  [] Progressing: [] Met: [] Not Met: [] Adjusted  4. Patient will improve TUG score from 36 sec to 28 sec to decrease risk of falls  [] Progressing: [] Met: [] Not Met: [] Adjusted  5. Patient/ to demonstrate I HEP with written instructions     [] Progressing: [] Met: [] Not Met: [] Adjusted    ASSESSMENT: Pt is  80 Y. O pleasantly confused female referred to PT due to unsteady gait and generalized weakness. Pt demonstrates good tolerance to therapy and is very motivated with participation during session. She continues to require constant cueing for upright posture and safe walker distances with use of rollator as well as throughout each exercise due to cognitive deficits. Pt is demonstrating slow progress toward established goals with improving strength and balance for improved upright postural control with functioal ambulation. Pt will benefit from cont PT to  promote return to highest level of functional independence and safety. Treatment/Activity Tolerance:  [x] Patient tolerated treatment well [] Patient limited by fatique  [] Patient limited by pain  [] Patient limited by other medical complications  [] Other:     Overall Progression Towards Functional goals/ Treatment Progress Update:  [x] Patient is progressing as expected towards functional goals listed. [] Progression is slowed due to complexities/Impairments listed. [] Progression has been slowed due to co-morbidities. [] Plan just implemented, too soon to assess goals progression <30days   [] Goals require adjustment due to lack of progress  [] Patient is not progressing as expected and requires additional follow up with physician  [] Other    Prognosis for POC: [x] Good [] Fair  [] Poor    Patient requires continued skilled intervention: [x] Yes  [] No        PLAN: See eval  [x] Continue per plan of care [] Alter current plan (see comments)  [] Plan of care initiated [] Hold pending MD visit [] Discharge    Electronically signed by: Alejandro Merchant, PT    Note: If patient does not return for scheduled/recommended follow up visits, this note will serve as a discharge from care along with the most recent update on progress.

## 2023-03-02 ENCOUNTER — HOSPITAL ENCOUNTER (OUTPATIENT)
Dept: PHYSICAL THERAPY | Age: 88
Setting detail: THERAPIES SERIES
Discharge: HOME | End: 2023-03-02
Payer: MEDICARE

## 2023-03-02 PROCEDURE — 97110 THERAPEUTIC EXERCISES: CPT

## 2023-03-02 PROCEDURE — 97112 NEUROMUSCULAR REEDUCATION: CPT

## 2023-03-02 NOTE — FLOWSHEET NOTE
[facility] Outpatient Therapy  [address] [place] [address], [address] [place] [address]  Phone: [phone]   Fax: [phone]    Physical Therapy Treatment Note/ Progress Report:     Date:  3/2/2023    Patient Name:  Yogi Olsen    :  3/28/1934  MRN: [id number]    Medical/Treatment Diagnosis Information:  Diagnosis: R26.81 unsteady gait  Treatment Diagnosis: R26.81 unsteady gait  Insurance/Certification information:  PT Insurance Information: [place] [employer]  Physician Information:  Ramo Jeff MD   Plan of care signed:    [x] Yes  [] No    Date of Patient follow up with Physician:      Progress Report: []  Yes  [x]  No     Date Range for reporting period:  Beginnin23  Endin23 (8 visits)    Progress report due (10 Rx/or 30 days whichever is less): 41     Recertification due (POC duration/ or 90 days whichever is less):      Visit # Insurance Allowable Auth Needed     []Yes   [x]No     RESTRICTIONS/PRECAUTIONS: high falls risk/osteoporosis  Latex Allergy:  [x]NO      []YES  Preferred Language for Healthcare:   [x]English       []other:    Functional Scale: ABC confidence scale: 10-11% confident  Functional score not completed due to  not present to assist pt and pt with cognitive deficits    Pain level:  0/10     SUBJECTIVE:  offers no c/o's    OBJECTIVE: See eval  Observation: flexed posture,shuffling gait and decreased step height,decreased safe walker distance,poor functional speed,  Test measurements: hip flex improved to 3+ B, hip extension remains 3-, abduction improved from 1/5 to 3-/5, knee extension lag remains in standing but able to get full passive knee extension  HS L improved from -45 to -35, R HS to -40  TUG: continues 40sec,38 sec, 36 sec with use of rollator  Thirty sec sit to stand (with use of hands) increased from 4 to 6      Exercises/Interventions: Exercises in bold performed in department today.   Items not bolded are carried forward from prior visits for continuity of the record. Exercise/Equipment Resistance/Repetitions HEP Other comments   Hamstring stretch 30 sec x 3     bridge 15 []    LAQ 3 sec hold x 10 []    HS 15 [] Slide sheet   AS 15 min/mod [] Slide sheet   SAQ 3 sec hold, x 15 []    Standing heel/toe raises 10 []    Standing march 10 []    Standing abduction 10 []    Standing calf stretch on block 30 sec x 2 []    STS from chair with single arm assist  x10  []    NUSTEP L7  10 min []    Standing Shoulder ext X15, yellow tband []      []      []     Static balance on Airex 3 min for upright postural control [] B UE assist   Lateral step ups 4\"\" block x 10 each [] // bars   Step overs 1\" block x 10 ea [] // bars   Step ups w/forward eccentric lowering and back 2\" block [] // bars     Home Exercise Program:  Access Code: [email or web address]  URL: [email or web address]  Date: 01/31/2023  Prepared by: Doris Mehta    Exercises  Standing Bilateral Gastroc Stretch with Step - 1 x daily - 7 x weekly - 1 sets - 15 reps  Standing March with Counter Support - 1 x daily - 7 x weekly - 1 sets - 15 reps  Standing Hip Abduction with Counter Support - 1 x daily - 7 x weekly - 1 sets - 15 reps  Heel Raises with Counter Support - 1 x daily - 7 x weekly - 1 sets - 15 reps  Supine Bridge - 1 x daily - 7 x weekly - 1 sets - 15 reps  Supine Heel Slide - 1 x daily - 7 x weekly - 1 sets - 15 reps  Supine Hip Abduction - 1 x daily - 7 x weekly - 1 sets - 15 reps  Small Range Straight Leg Raise - 1 x daily - 7 x weekly - 1 sets - 15 reps  Seated Long Arc Quad - 1 x daily - 7 x weekly - 1 sets - 15 reps        Therapeutic Exercise:   [x] (46823) Provided verbal/tactile cueing for activities related to strengthening, flexibility, endurance, ROM for improvements in LE, proximal hip, and core control with self-care, mobility, lifting, ambulation    NMR:  [x] (00665) Provided verbal/tactile cueing for activities related to improving balance, coordination, kinesthetic sense, posture, motor skill, proprioception to assist with LE, proximal hip, and core control in self-care, mobility, lifting, ambulation and eccentric single leg control. Per list x 32    Therapeutic Activities:    [] (31617) Provided verbal/tactile cueing for activities related to improving balance, coordination, kinesthetic sense, posture, motor skill, proprioception and motor activation to allow for proper function of core, proximal hip and LE with self-care and ADLs and functional mobility. Gait Training:    [] (55799) Provided training and instruction to the patient for proper LE, core and proximal hip recruitment and positioning and eccentric body weight control with ambulation re-education including     Manual Treatments:  PROM / STM / Oscillations-Mobs:  G-I, II, III, IV (PA's, Inf., Post.)  [] (15589) Provided manual therapy to mobilize LE, proximal hip and/or LS spine soft tissue/joints for the purpose of modulating pain, promoting relaxation,  increasing ROM, reducing/eliminating soft tissue swelling/inflammation/restriction, improving soft tissue extensibility and allowing for proper ROM for normal function with self-care, mobility, lifting and ambulation.      Home Exercise Program:    [x] (58364) Reviewed/Progressed HEP activities related to strengthening, flexibility, endurance, ROM of core, proximal hip and LE for functional self-care, mobility, lifting and ambulation/stair navigation   [] (84909) Reviewed/Progressed HEP activities related to improving balance, coordination, kinesthetic sense, posture, motor skill, proprioception of core, proximal hip and LE for self-care, mobility, lifting, and ambulation/stair navigation      Modalities:    [] Electric Stimulation:   [] Ultrasound:   [] Other:       Charges:  Timed Code Treatment Minutes: NMR x 17, TE x 26   Total Treatment Minutes:  43min      [] EVAL (LOW) 09058 (typically 20 minutes face-to-face)  [] EVAL (MOD) 00890 (typically 30 minutes face-to-face)  [] EVAL (HIGH) 54905 (typically 45 minutes face-to-face)  [] RE-EVAL     [x] YP(19747) x  2    [x] NMR (40126) x1     [] Manual (69212) x       [] TA (44437) x 1      [] Gait Training (86108)    [] ES(attended) (82612)  [] ES (un) (23 [id number])   [] DRY NEEDLE 1 OR 2 MUSCLES  [] DRY NEEDLE 3+ MUSCLES  [] Mech Traction (35366)  [] Ultrasound (79540)  [] Other:      GOALS:   Patient stated goal: \" walk better and be I with shower\"  [] Progressing: [] Met: [] Not Met: [] Adjusted     Therapist goals for Patient:   Short Term Goals: To be achieved in: 4 weeks  1. Pt/cg will demonstrate/verbalize I knowledge of home safety awareness/falls risk prevention with use of AD   [] Progressing: [] Met: [] Not Met: [] Adjusted  2. Patient will improve LE flexibility throughout hamstrings/heelcords and hip flexors to enable pt to assume upright posture in standing  [] Progressing: [] Met: [] Not Met: [] Adjusted     Long Term Goals: To be achieved in: 8 weeks  1. Disability index score of 50% on the confidence scale  to assist with reaching prior level of function with decreased fear of falling  [] Progressing: [] Met: [] Not Met: [] Adjusted  2. Patient will demonstrate improvement in thirty second STS test from 4 to 7    [] Progressing: [] Met: [] Not Met: [] Adjusted  3. Patient will demonstrate an increase in Strength to 4/5 throughout B LE's for good balance reactions,gait components, and to maintain upright postural control with ambulating functional distances throughout home and community with LRAD  [] Progressing: [] Met: [] Not Met: [] Adjusted  4. Patient will improve TUG score from 36 sec to 28 sec to decrease risk of falls  [] Progressing: [] Met: [] Not Met: [] Adjusted  5. Patient/ to demonstrate I HEP with written instructions     [] Progressing: [] Met: [] Not Met: [] Adjusted    ASSESSMENT: Pt is  80 Y. O pleasantly confused female referred to PT due to unsteady gait and generalized weakness. Pt demonstrates good tolerance to therapy and is very motivated with participation during session. She continues to require constant cueing for upright posture and safe walker distances with use of rollator as well as throughout each exercise due to cognitive deficits. Pt is demonstrating slow progress toward established goals with improving strength and balance for improved upright postural control with functioal ambulation. Pt will benefit from cont PT to  promote return to highest level of functional independence and safety. Treatment/Activity Tolerance:  [x] Patient tolerated treatment well [] Patient limited by fatique  [] Patient limited by pain  [] Patient limited by other medical complications  [] Other:     Overall Progression Towards Functional goals/ Treatment Progress Update:  [x] Patient is progressing as expected towards functional goals listed. [] Progression is slowed due to complexities/Impairments listed. [] Progression has been slowed due to co-morbidities. [] Plan just implemented, too soon to assess goals progression <30days   [] Goals require adjustment due to lack of progress  [] Patient is not progressing as expected and requires additional follow up with physician  [] Other    Prognosis for POC: [x] Good [] Fair  [] Poor    Patient requires continued skilled intervention: [x] Yes  [] No        PLAN: See eval  [x] Continue per plan of care [] Alter current plan (see comments)  [] Plan of care initiated [] Hold pending MD visit [] Discharge    Electronically signed by: Ailyn Ferrer PT    Note: If patient does not return for scheduled/recommended follow up visits, this note will serve as a discharge from care along with the most recent update on progress.

## 2023-03-07 ENCOUNTER — HOSPITAL ENCOUNTER (OUTPATIENT)
Dept: PHYSICAL THERAPY | Age: 88
Setting detail: THERAPIES SERIES
Discharge: HOME | End: 2023-03-07
Payer: MEDICARE

## 2023-03-07 PROCEDURE — 97110 THERAPEUTIC EXERCISES: CPT

## 2023-03-07 PROCEDURE — 97112 NEUROMUSCULAR REEDUCATION: CPT

## 2023-03-07 NOTE — FLOWSHEET NOTE
[facility] Outpatient Therapy  [address] [place] [address],  [address]otti 51, [address]  Phone: [phone]   Fax: [phone]    Physical Therapy Treatment Note/ Progress Report:     Date:  3/7/2023    Patient Name:  Lesly Ramon    :  3/28/1934  MRN: [id number]    Medical/Treatment Diagnosis Information:  Diagnosis: R26.81 unsteady gait  Treatment Diagnosis: R26.81 unsteady gait  Insurance/Certification information:  PT Insurance Information: [facility]  Physician Information:  Noris Salas MD   Plan of care signed:    [x] Yes  [] No    Date of Patient follow up with Physician:      Progress Report: []  Yes  [x]  No     Date Range for reporting period:  Beginnin23  Endin23 (8 visits)    Progress report due (10 Rx/or 30 days whichever is less):      Recertification due (POC duration/ or 90 days whichever is less):      Visit # Insurance Allowable Auth Needed     []Yes   [x]No     RESTRICTIONS/PRECAUTIONS: high falls risk/osteoporosis  Latex Allergy:  [x]NO      []YES  Preferred Language for Healthcare:   [x]English       []other:    Functional Scale: ABC confidence scale: 10-11% confident  Functional score not completed due to  not present to assist pt and pt with cognitive deficits    Pain level:  0/10     SUBJECTIVE:  offers no c/o's    OBJECTIVE: See eval  Observation: flexed posture,shuffling gait and decreased step height,decreased safe walker distance,poor functional speed,  Test measurements: hip flex improved to 3+ B, hip extension remains 3-, abduction improved from 1/5 to 3-/5, knee extension lag remains in standing but able to get full passive knee extension  HS L improved from -45 to -35, R HS to -40  TUG: continues 40sec,38 sec, 36 sec with use of rollator  Thirty sec sit to stand (with use of hands) increased from 4 to 6      Exercises/Interventions: Exercises in bold performed in department today.   Items not bolded are carried forward from prior visits for continuity of the record. Exercise/Equipment Resistance/Repetitions HEP Other comments   Hamstring stretch 30 sec x 3     bridge 15 []    LAQ 3 sec hold x 10 []    HS 15 [] Slide sheet   AS 15 min/mod [] Slide sheet   SAQ 3 sec hold, x 15 []    Standing heel/toe raises 10 []    Standing march 10 []    Standing abduction 10 []    Standing calf stretch on block 30 sec x 2 []    STS from chair with single arm assist  x10  []    NUSTEP L5  10 min []    Standing Shoulder ext X15, yellow tband []      []      []     Static balance on Airex 3 min for upright postural control [] B UE assist   front step ups 4\"\" block x 10 each [] // bars   Step overs 1\" block x 10 ea [] // bars   Step ups w/forward eccentric lowering and back 2\" block [] // bars     Home Exercise Program:  Access Code: [email or web address]  URL: [email or web address]  Date: 01/31/2023  Prepared by: Krissy Lim    Exercises  Standing Bilateral Gastroc Stretch with Step - 1 x daily - 7 x weekly - 1 sets - 15 reps  Standing March with Counter Support - 1 x daily - 7 x weekly - 1 sets - 15 reps  Standing Hip Abduction with Counter Support - 1 x daily - 7 x weekly - 1 sets - 15 reps  Heel Raises with Counter Support - 1 x daily - 7 x weekly - 1 sets - 15 reps  Supine Bridge - 1 x daily - 7 x weekly - 1 sets - 15 reps  Supine Heel Slide - 1 x daily - 7 x weekly - 1 sets - 15 reps  Supine Hip Abduction - 1 x daily - 7 x weekly - 1 sets - 15 reps  Small Range Straight Leg Raise - 1 x daily - 7 x weekly - 1 sets - 15 reps  Seated Long Arc Quad - 1 x daily - 7 x weekly - 1 sets - 15 reps        Therapeutic Exercise:   [x] (59408) Provided verbal/tactile cueing for activities related to strengthening, flexibility, endurance, ROM for improvements in LE, proximal hip, and core control with self-care, mobility, lifting, ambulation per list x 30 min    NMR:  [x] (00482) Provided verbal/tactile cueing for activities related to improving balance, coordination, kinesthetic sense, posture, motor skill, proprioception to assist with LE, proximal hip, and core control in self-care, mobility, lifting, ambulation and eccentric single leg control. Per list x 15    Therapeutic Activities:    [] (67512) Provided verbal/tactile cueing for activities related to improving balance, coordination, kinesthetic sense, posture, motor skill, proprioception and motor activation to allow for proper function of core, proximal hip and LE with self-care and ADLs and functional mobility. Gait Training:    [] (27807) Provided training and instruction to the patient for proper LE, core and proximal hip recruitment and positioning and eccentric body weight control with ambulation re-education including     Manual Treatments:  PROM / STM / Oscillations-Mobs:  G-I, II, III, IV (PA's, Inf., Post.)  [] (57783) Provided manual therapy to mobilize LE, proximal hip and/or LS spine soft tissue/joints for the purpose of modulating pain, promoting relaxation,  increasing ROM, reducing/eliminating soft tissue swelling/inflammation/restriction, improving soft tissue extensibility and allowing for proper ROM for normal function with self-care, mobility, lifting and ambulation.      Home Exercise Program:    [x] (24271) Reviewed/Progressed HEP activities related to strengthening, flexibility, endurance, ROM of core, proximal hip and LE for functional self-care, mobility, lifting and ambulation/stair navigation   [] (15685) Reviewed/Progressed HEP activities related to improving balance, coordination, kinesthetic sense, posture, motor skill, proprioception of core, proximal hip and LE for self-care, mobility, lifting, and ambulation/stair navigation      Modalities:    [] Electric Stimulation:   [] Ultrasound:   [] Other:       Charges:  Timed Code Treatment Minutes: NMR x 15, TE x 30   Total Treatment Minutes:  45 min      [] EVAL (LOW) 42551 (typically 20 minutes face-to-face)  [] EVAL (MOD) 65169 (typically 30 minutes face-to-face)  [] EVAL (HIGH) 56978 (typically 45 minutes face-to-face)  [] RE-EVAL     [x] DG(08430) x  2    [x] NMR (58693) x1     [] Manual (91125) x       [] TA (28835) x 1      [] Gait Training (40176)    [] ES(attended) (48304)  [] ES (un) (22 [id number])   [] DRY NEEDLE 1 OR 2 MUSCLES  [] DRY NEEDLE 3+ MUSCLES  [] Mech Traction (48443)  [] Ultrasound (06664)  [] Other:      GOALS:   Patient stated goal: \" walk better and be I with shower\"  [] Progressing: [] Met: [] Not Met: [] Adjusted     Therapist goals for Patient:   Short Term Goals: To be achieved in: 4 weeks  1. Pt/cg will demonstrate/verbalize I knowledge of home safety awareness/falls risk prevention with use of AD   [] Progressing: [] Met: [] Not Met: [] Adjusted  2. Patient will improve LE flexibility throughout hamstrings/heelcords and hip flexors to enable pt to assume upright posture in standing  [] Progressing: [] Met: [] Not Met: [] Adjusted     Long Term Goals: To be achieved in: 8 weeks  1. Disability index score of 50% on the confidence scale  to assist with reaching prior level of function with decreased fear of falling  [] Progressing: [] Met: [] Not Met: [] Adjusted  2. Patient will demonstrate improvement in thirty second STS test from 4 to 7    [] Progressing: [] Met: [] Not Met: [] Adjusted  3. Patient will demonstrate an increase in Strength to 4/5 throughout B LE's for good balance reactions,gait components, and to maintain upright postural control with ambulating functional distances throughout home and community with LRAD  [] Progressing: [] Met: [] Not Met: [] Adjusted  4. Patient will improve TUG score from 36 sec to 28 sec to decrease risk of falls  [] Progressing: [] Met: [] Not Met: [] Adjusted  5. Patient/ to demonstrate I HEP with written instructions     [] Progressing: [] Met: [] Not Met: [] Adjusted    ASSESSMENT: Pt is  80 Y. O pleasantly confused female referred to PT due to unsteady gait and generalized weakness. Pt demonstrates good tolerance to therapy and is very motivated with participation during session. She continues to require constant cueing for upright posture and safe walker distances with use of rollator as well as throughout each exercise due to cognitive deficits. Pt is demonstrating slow progress toward established goals with improving strength and balance for improved upright postural control with functional ambulation. Pt will benefit from cont PT to  promote return to highest level of functional independence and safety. Treatment/Activity Tolerance:  [x] Patient tolerated treatment well [] Patient limited by fatique  [] Patient limited by pain  [] Patient limited by other medical complications  [] Other:     Overall Progression Towards Functional goals/ Treatment Progress Update:  [x] Patient is progressing as expected towards functional goals listed. [] Progression is slowed due to complexities/Impairments listed. [] Progression has been slowed due to co-morbidities. [] Plan just implemented, too soon to assess goals progression <30days   [] Goals require adjustment due to lack of progress  [] Patient is not progressing as expected and requires additional follow up with physician  [] Other    Prognosis for POC: [x] Good [] Fair  [] Poor    Patient requires continued skilled intervention: [x] Yes  [] No        PLAN: See eval  [x] Continue per plan of care [] Alter current plan (see comments)  [] Plan of care initiated [] Hold pending MD visit [] Discharge    Electronically signed by: Wil Rojas PT    Note: If patient does not return for scheduled/recommended follow up visits, this note will serve as a discharge from care along with the most recent update on progress.

## 2023-03-09 ENCOUNTER — HOSPITAL ENCOUNTER (OUTPATIENT)
Dept: PHYSICAL THERAPY | Age: 88
Setting detail: THERAPIES SERIES
Discharge: HOME | End: 2023-03-09
Payer: MEDICARE

## 2023-03-09 PROCEDURE — 97112 NEUROMUSCULAR REEDUCATION: CPT

## 2023-03-09 PROCEDURE — 97110 THERAPEUTIC EXERCISES: CPT

## 2023-03-09 NOTE — FLOWSHEET NOTE
[facility] Outpatient Therapy  [address] [place] [address],  [address], [address]  Phone: [phone]   Fax: [phone]    Physical Therapy Treatment Note/ Progress Report:     Date:  3/9/2023    Patient Name:  Florina Dugan    :  3/28/1934  MRN: [id number]    Medical/Treatment Diagnosis Information:  Diagnosis: R26.81 unsteady gait  Treatment Diagnosis: R26.81 unsteady gait  Insurance/Certification information:  PT Insurance Information: [address] [place] [address] [employer]  Physician Information:  Gordy Mota MD   Plan of care signed:    [x] Yes  [] No    Date of Patient follow up with Physician:      Progress Report: []  Yes  [x]  No     Date Range for reporting period:  Beginnin23  Endin23 (8 visits)    Progress report due (10 Rx/or 30 days whichever is less):      Recertification due (POC duration/ or 90 days whichever is less):      Visit # Insurance Allowable Auth Needed     []Yes   [x]No     RESTRICTIONS/PRECAUTIONS: high falls risk/osteoporosis  Latex Allergy:  [x]NO      []YES  Preferred Language for Healthcare:   [x]English       []other:    Functional Scale: ABC confidence scale: 10-11% confident  Functional score not completed due to  not present to assist pt and pt with cognitive deficits    Pain level:  0/10     SUBJECTIVE:  offers no c/o's    OBJECTIVE: See eval  Observation: flexed posture,shuffling gait and decreased step height,decreased safe walker distance,poor functional speed,  Test measurements: hip flex improved to 3+ B, hip extension remains 3-, abduction improved from 1/5 to 3-/5, knee extension lag remains in standing but able to get full passive knee extension  HS L improved from -45 to -35, R HS to -40  TUG: continues 40sec,38 sec, 36 sec with use of rollator  Thirty sec sit to stand (with use of hands) increased from 4 to 6      Exercises/Interventions: Exercises in bold performed in department today.   Items not bolded are carried forward from prior visits for continuity of the record. Exercise/Equipment Resistance/Repetitions HEP Other comments   Hamstring stretch 30 sec x 3     bridge 15 []    LAQ 3 sec hold x 10 []    HS 15 [] Slide sheet   AS 15 min/mod [] Slide sheet   SAQ 3 sec hold, x 15 []    Standing heel raises 10 []    Standing march 10 []    Standing abduction 10 []    Standing calf stretch on block 30 sec x 2 []    STS from chair with single arm assist  x10  []    NUSTEP L5  10 min []    Standing Shoulder ext X15, yellow tband []      []      []     Static balance on Airex 3 min for upright postural control [] B UE assist   Lateral step ups 2\" block x 10 each [] // bars   Step overs 1\" block x 10 ea [] // bars   Alt toe taps on bottom step x10 []      Home Exercise Program:  Access Code: [email or web address]  URL: [email or web address]  Date: 01/31/2023  Prepared by: Jolene Xavier    Exercises  Standing Bilateral Gastroc Stretch with Step - 1 x daily - 7 x weekly - 1 sets - 15 reps  Standing March with Counter Support - 1 x daily - 7 x weekly - 1 sets - 15 reps  Standing Hip Abduction with Counter Support - 1 x daily - 7 x weekly - 1 sets - 15 reps  Heel Raises with Counter Support - 1 x daily - 7 x weekly - 1 sets - 15 reps  Supine Bridge - 1 x daily - 7 x weekly - 1 sets - 15 reps  Supine Heel Slide - 1 x daily - 7 x weekly - 1 sets - 15 reps  Supine Hip Abduction - 1 x daily - 7 x weekly - 1 sets - 15 reps  Small Range Straight Leg Raise - 1 x daily - 7 x weekly - 1 sets - 15 reps  Seated Long Arc Quad - 1 x daily - 7 x weekly - 1 sets - 15 reps        Therapeutic Exercise:   [x] (15151) Provided verbal/tactile cueing for activities related to strengthening, flexibility, endurance, ROM for improvements in LE, proximal hip, and core control with self-care, mobility, lifting, ambulation per list x 30 min    NMR:  [x] (57839) Provided verbal/tactile cueing for activities related to improving balance, coordination, kinesthetic sense, posture, motor skill, proprioception to assist with LE, proximal hip, and core control in self-care, mobility, lifting, ambulation and eccentric single leg control. Per list x 12    Therapeutic Activities:    [] (50169) Provided verbal/tactile cueing for activities related to improving balance, coordination, kinesthetic sense, posture, motor skill, proprioception and motor activation to allow for proper function of core, proximal hip and LE with self-care and ADLs and functional mobility. Gait Training:    [] (97942) Provided training and instruction to the patient for proper LE, core and proximal hip recruitment and positioning and eccentric body weight control with ambulation re-education including     Manual Treatments:  PROM / STM / Oscillations-Mobs:  G-I, II, III, IV (PA's, Inf., Post.)  [] (89108) Provided manual therapy to mobilize LE, proximal hip and/or LS spine soft tissue/joints for the purpose of modulating pain, promoting relaxation,  increasing ROM, reducing/eliminating soft tissue swelling/inflammation/restriction, improving soft tissue extensibility and allowing for proper ROM for normal function with self-care, mobility, lifting and ambulation.      Home Exercise Program:    [x] (37061) Reviewed/Progressed HEP activities related to strengthening, flexibility, endurance, ROM of core, proximal hip and LE for functional self-care, mobility, lifting and ambulation/stair navigation   [] (11221) Reviewed/Progressed HEP activities related to improving balance, coordination, kinesthetic sense, posture, motor skill, proprioception of core, proximal hip and LE for self-care, mobility, lifting, and ambulation/stair navigation      Modalities:    [] Electric Stimulation:   [] Ultrasound:   [] Other:       Charges:  Timed Code Treatment Minutes: NMR x 12, TE x 30   Total Treatment Minutes:  42 min      [] EVAL (LOW) 85936 (typically 20 minutes face-to-face)  [] EVAL (MOD) 97515 (typically 30 minutes face-to-face)  [] EVAL (HIGH) 52359 (typically 45 minutes face-to-face)  [] RE-EVAL     [x] PH(81717) x  2    [x] NMR (70888) x1     [] Manual (93558) x       [] TA (54286) x 1      [] Gait Training (46363)    [] ES(attended) (14658)  [] ES (un) (22 [id number])   [] DRY NEEDLE 1 OR 2 MUSCLES  [] DRY NEEDLE 3+ MUSCLES  [] Mech Traction (77044)  [] Ultrasound (65407)  [] Other:      GOALS:   Patient stated goal: \" walk better and be I with shower\"  [] Progressing: [] Met: [] Not Met: [] Adjusted     Therapist goals for Patient:   Short Term Goals: To be achieved in: 4 weeks  1. Pt/cg will demonstrate/verbalize I knowledge of home safety awareness/falls risk prevention with use of AD   [] Progressing: [] Met: [] Not Met: [] Adjusted  2. Patient will improve LE flexibility throughout hamstrings/heelcords and hip flexors to enable pt to assume upright posture in standing  [] Progressing: [] Met: [] Not Met: [] Adjusted     Long Term Goals: To be achieved in: 8 weeks  1. Disability index score of 50% on the confidence scale  to assist with reaching prior level of function with decreased fear of falling  [] Progressing: [] Met: [] Not Met: [] Adjusted  2. Patient will demonstrate improvement in thirty second STS test from 4 to 7    [] Progressing: [] Met: [] Not Met: [] Adjusted  3. Patient will demonstrate an increase in Strength to 4/5 throughout B LE's for good balance reactions,gait components, and to maintain upright postural control with ambulating functional distances throughout home and community with LRAD  [] Progressing: [] Met: [] Not Met: [] Adjusted  4. Patient will improve TUG score from 36 sec to 28 sec to decrease risk of falls  [] Progressing: [] Met: [] Not Met: [] Adjusted  5. Patient/ to demonstrate I HEP with written instructions     [] Progressing: [] Met: [] Not Met: [] Adjusted    ASSESSMENT: Pt is  80 Y. O pleasantly confused female referred to PT due to unsteady gait,generalized weakness and decreased postural control with walking. Pt demonstrates good tolerance to therapy and is very motivated with participation during session. She continues to require constant cueing for upright posture and safe walker distances with use of rollator as well as throughout each exercise due to cognitive deficits. Pt is demonstrating slow progress with improved strength, step length and upright postural control with ambulation. Pt continues with significant tightness in calf,hamstrings and hip flexors with weakness throughout gluts,core and plantarflexion. Pt will benefit from cont PT to  promote return to highest level of functional independence and safety. Treatment/Activity Tolerance:  [x] Patient tolerated treatment well [] Patient limited by fatique  [] Patient limited by pain  [] Patient limited by other medical complications  [] Other:     Overall Progression Towards Functional goals/ Treatment Progress Update:  [x] Patient is progressing as expected towards functional goals listed. [] Progression is slowed due to complexities/Impairments listed. [] Progression has been slowed due to co-morbidities. [] Plan just implemented, too soon to assess goals progression <30days   [] Goals require adjustment due to lack of progress  [] Patient is not progressing as expected and requires additional follow up with physician  [] Other    Prognosis for POC: [x] Good [] Fair  [] Poor    Patient requires continued skilled intervention: [x] Yes  [] No        PLAN: See eval  [x] Continue per plan of care [] Alter current plan (see comments)  [] Plan of care initiated [] Hold pending MD visit [] Discharge    Electronically signed by: Jolene Xavire PT    Note: If patient does not return for scheduled/recommended follow up visits, this note will serve as a discharge from care along with the most recent update on progress.

## 2023-03-14 ENCOUNTER — HOSPITAL ENCOUNTER (OUTPATIENT)
Dept: PHYSICAL THERAPY | Age: 88
Setting detail: THERAPIES SERIES
Discharge: HOME | End: 2023-03-14
Payer: MEDICARE

## 2023-03-14 PROCEDURE — 97110 THERAPEUTIC EXERCISES: CPT

## 2023-03-14 PROCEDURE — 97112 NEUROMUSCULAR REEDUCATION: CPT

## 2023-03-14 NOTE — FLOWSHEET NOTE
[facility] Outpatient Therapy  [address] [employer], [address], [address]  Phone: [phone]   Fax: [phone]    Physical Therapy Treatment Note/ Progress Report:     Date:  3/14/2023    Patient Name:  Lorie Leblanc    :  3/28/1934  MRN: [id number]    Medical/Treatment Diagnosis Information:  Diagnosis: R26.81 unsteady gait  Treatment Diagnosis: R26.81 unsteady gait  Insurance/Certification information:  PT Insurance Information: [facility] [employer]  Physician Information:  Rickey Hernandez MD   Plan of care signed:    [x] Yes  [] No    Date of Patient follow up with Physician:      Progress Report: []  Yes  [x]  No     Date Range for reporting period:  Beginnin23  Endin23 (8 visits)    Progress report due (10 Rx/or 30 days whichever is less): 83     Recertification due (POC duration/ or 90 days whichever is less):      Visit # Insurance Allowable Auth Needed   15/16  []Yes   [x]No     RESTRICTIONS/PRECAUTIONS: high falls risk/osteoporosis  Latex Allergy:  [x]NO      []YES  Preferred Language for Healthcare:   [x]English       []other:    Functional Scale: ABC confidence scale: 10-11% confident  Functional score not completed due to  not present to assist pt and pt with cognitive deficits    Pain level:  0/10     SUBJECTIVE:  offers no c/o's, reports feeling stronger    OBJECTIVE: See eval  Observation: flexed posture,shuffling gait and decreased step height,decreased safe walker distance,poor functional speed,  Test measurements: hip flex improved to 3+ B, hip extension remains 3-, abduction improved from 1/5 to 3-/5, knee extension lag remains in standing but able to get full passive knee extension  HS L improved from -45 to -35, R HS to -40  TUG: continues 40sec,38 sec, 36 sec with use of rollator  Thirty sec sit to stand (with use of hands) increased from 4 to 6      Exercises/Interventions: Exercises in bold performed in department today.   Items not bolded are carried forward from prior visits for continuity of the record. Exercise/Equipment Resistance/Repetitions HEP Other comments   Hamstring/heel cord stretch manual     bridge 15 []    SLR 15ea     LAQ 3 sec hold x 10 []    HS 15 [] Slide sheet   AS 15 min/mod [] Slide sheet   SAQ 3 sec hold, x 15 []    Standing heel raises 10 []    Standing march 10 []    Standing abduction 10 []    Standing calf stretch on block 30 sec x 2 []    STS from slightly elevated robin table  x10  []    NUSTEP L5  10 min []    Standing Shoulder ext X15, yellow tband []      []    Standing TKE at EOB 10 []     Static balance on Airex 3 min for upright postural control [] B UE assist   Lateral step ups 2\" block x 10 each [] // bars   Step overs 1\" block x 10 ea [] // bars   Alt toe taps on bottom step x10 []      Home Exercise Program:  Access Code: [email or web address]  URL: [email or web address]  Date: 01/31/2023  Prepared by: Mortimer Cork    Exercises  Standing Bilateral Gastroc Stretch with Step - 1 x daily - 7 x weekly - 1 sets - 15 reps  Standing March with Counter Support - 1 x daily - 7 x weekly - 1 sets - 15 reps  Standing Hip Abduction with Counter Support - 1 x daily - 7 x weekly - 1 sets - 15 reps  Heel Raises with Counter Support - 1 x daily - 7 x weekly - 1 sets - 15 reps  Supine Bridge - 1 x daily - 7 x weekly - 1 sets - 15 reps  Supine Heel Slide - 1 x daily - 7 x weekly - 1 sets - 15 reps  Supine Hip Abduction - 1 x daily - 7 x weekly - 1 sets - 15 reps  Small Range Straight Leg Raise - 1 x daily - 7 x weekly - 1 sets - 15 reps  Seated Long Arc Quad - 1 x daily - 7 x weekly - 1 sets - 15 reps        Therapeutic Exercise:   [x] (34642) Provided verbal/tactile cueing for activities related to strengthening, flexibility, endurance, ROM for improvements in LE, proximal hip, and core control with self-care, mobility, lifting, ambulation per list x 30 min    NMR:  [x] (62398) Provided verbal/tactile cueing for activities related to improving balance, coordination, kinesthetic sense, posture, motor skill, proprioception to assist with LE, proximal hip, and core control in self-care, mobility, lifting, ambulation and eccentric single leg control. Per list x 12    Therapeutic Activities:    [] (36285) Provided verbal/tactile cueing for activities related to improving balance, coordination, kinesthetic sense, posture, motor skill, proprioception and motor activation to allow for proper function of core, proximal hip and LE with self-care and ADLs and functional mobility. Gait Training:    [] (82517) Provided training and instruction to the patient for proper LE, core and proximal hip recruitment and positioning and eccentric body weight control with ambulation re-education including     Manual Treatments:  PROM / STM / Oscillations-Mobs:  G-I, II, III, IV (PA's, Inf., Post.)  [] (96228) Provided manual therapy to mobilize LE, proximal hip and/or LS spine soft tissue/joints for the purpose of modulating pain, promoting relaxation,  increasing ROM, reducing/eliminating soft tissue swelling/inflammation/restriction, improving soft tissue extensibility and allowing for proper ROM for normal function with self-care, mobility, lifting and ambulation.  Per list x 5 min (no charge)    Home Exercise Program:    [x] (25363) Reviewed/Progressed HEP activities related to strengthening, flexibility, endurance, ROM of core, proximal hip and LE for functional self-care, mobility, lifting and ambulation/stair navigation   [] (19072) Reviewed/Progressed HEP activities related to improving balance, coordination, kinesthetic sense, posture, motor skill, proprioception of core, proximal hip and LE for self-care, mobility, lifting, and ambulation/stair navigation      Modalities:    [] Electric Stimulation:   [] Ultrasound:   [] Other:       Charges:  Timed Code Treatment Minutes: NMR x 13, TE x 27   Total Treatment Minutes:  45 min      [] EVAL (LOW) 57043 (typically 20 minutes face-to-face)  [] EVAL (MOD) 21481 (typically 30 minutes face-to-face)  [] EVAL (HIGH) 30014 (typically 45 minutes face-to-face)  [] RE-EVAL     [x] ZG(11159) x  2    [x] NMR (11430) x1     [] Manual (10638) x       [] TA (86047) x 1      [] Gait Training (25487)    [] ES(attended) (66830)  [] ES (un) (22997)   [] DRY NEEDLE 1 OR 2 MUSCLES  [] DRY NEEDLE 3+ MUSCLES  [] Mech Traction (40867)  [] Ultrasound (86192)  [] Other:      GOALS:   Patient stated goal: \" walk better and be I with shower\"  [] Progressing: [] Met: [] Not Met: [] Adjusted     Therapist goals for Patient:   Short Term Goals: To be achieved in: 4 weeks  1. Pt/cg will demonstrate/verbalize I knowledge of home safety awareness/falls risk prevention with use of AD   [] Progressing: [] Met: [] Not Met: [] Adjusted  2. Patient will improve LE flexibility throughout hamstrings/heelcords and hip flexors to enable pt to assume upright posture in standing  [] Progressing: [] Met: [] Not Met: [] Adjusted     Long Term Goals: To be achieved in: 8 weeks  1. Disability index score of 50% on the confidence scale  to assist with reaching prior level of function with decreased fear of falling  [] Progressing: [] Met: [] Not Met: [] Adjusted  2. Patient will demonstrate improvement in thirty second STS test from 4 to 7    [] Progressing: [] Met: [] Not Met: [] Adjusted  3. Patient will demonstrate an increase in Strength to 4/5 throughout B LE's for good balance reactions,gait components, and to maintain upright postural control with ambulating functional distances throughout home and community with LRAD  [] Progressing: [] Met: [] Not Met: [] Adjusted  4. Patient will improve TUG score from 36 sec to 28 sec to decrease risk of falls  [] Progressing: [] Met: [] Not Met: [] Adjusted  5. Patient/ to demonstrate I HEP with written instructions     [] Progressing: [] Met: [] Not Met: [] Adjusted    ASSESSMENT: Pt is  80 Y. O pleasantly confused female referred to PT due to unsteady gait,generalized weakness and decreased postural control with walking. Pt demonstrates good tolerance to therapy and is very motivated with participation during session. She continues to require constant cueing for upright posture and safe walker distances with use of rollator as well as throughout each exercise due to cognitive deficits. Pt is demonstrating slow progress with improved strength, step length and upright postural control with ambulation. Pt continues with significant tightness in calf,hamstrings and hip flexors with weakness throughout gluts,core and plantarflexion. Pt will benefit from cont PT to  promote return to highest level of functional independence and safety. Treatment/Activity Tolerance:  [x] Patient tolerated treatment well [] Patient limited by fatique  [] Patient limited by pain  [] Patient limited by other medical complications  [] Other:     Overall Progression Towards Functional goals/ Treatment Progress Update:  [x] Patient is progressing as expected towards functional goals listed. [] Progression is slowed due to complexities/Impairments listed. [] Progression has been slowed due to co-morbidities. [] Plan just implemented, too soon to assess goals progression <30days   [] Goals require adjustment due to lack of progress  [] Patient is not progressing as expected and requires additional follow up with physician  [] Other    Prognosis for POC: [x] Good [] Fair  [] Poor    Patient requires continued skilled intervention: [x] Yes  [] No        PLAN: See eval  [x] Continue per plan of care [] Alter current plan (see comments)  [] Plan of care initiated [] Hold pending MD visit [] Discharge    Electronically signed by: Brenda Field PT    Note: If patient does not return for scheduled/recommended follow up visits, this note will serve as a discharge from care along with the most recent update on progress.

## 2023-03-17 ENCOUNTER — HOSPITAL ENCOUNTER (OUTPATIENT)
Dept: PHYSICAL THERAPY | Age: 88
Setting detail: THERAPIES SERIES
Discharge: HOME | End: 2023-03-17
Payer: MEDICARE

## 2023-03-17 ENCOUNTER — APPOINTMENT (OUTPATIENT)
Dept: PHYSICAL THERAPY | Age: 88
End: 2023-03-17
Payer: MEDICARE

## 2023-03-17 PROCEDURE — 97140 MANUAL THERAPY 1/> REGIONS: CPT

## 2023-03-17 PROCEDURE — 97750 PHYSICAL PERFORMANCE TEST: CPT

## 2023-03-17 PROCEDURE — 97110 THERAPEUTIC EXERCISES: CPT

## 2023-03-17 NOTE — PROGRESS NOTES
[facility] Outpatient Therapy  [address] [facility] [employer], [address] [place] [address]  Phone: [phone]   Fax: [phone]    Physical Therapy Treatment Note/ Progress Report:     Date:  3/17/2023    Patient Name:  Xiomy Bustillos    :  3/28/1934  MRN: [id number]    Medical/Treatment Diagnosis Information:  Diagnosis: R26.81 unsteady gait  Treatment Diagnosis: R26.81 unsteady gait  Insurance/Certification information:  PT Insurance Information: A[employer]na Medicare  Physician Information:  Carli Longoria MD   Plan of care signed:    [x] Yes  [] No    Date of Patient follow up with Physician:      Progress Report: [x]  Yes  []  No     Date Range for reporting period:  Beginnin23  Ending:  3/17/23 (8 visits)    Progress report due (10 Rx/or 30 days whichever is less):      Recertification due (POC duration/ or 90 days whichever is less):      Visit # Insurance Allowable Auth Needed     []Yes   [x]No     RESTRICTIONS/PRECAUTIONS: high falls risk/osteoporosis  Latex Allergy:  [x]NO      []YES  Preferred Language for Healthcare:   [x]English       []other:    Functional Scale: ABC confidence scale: 10-11% confident  Functional score not completed due to cognitive deficits and  not present, pt took form to return next visit.      Pain level:  0/10     SUBJECTIVE:  offers no c/o's,  reports \" my  said I am walking better\"    OBJECTIVE: See eval  Observation: flexed posture,shuffling gait and decreased step height,decreased safe walker distance,poor functional speed,  Test measurements:   Strength: hip flex improved to 3+ B, hip extension remains 3-, abduction improved from 1/5 to 3-/5, knee extension lag remains in standing but able to get full passive knee extension  HS L improved from -45 to L -20,  R HS to -32  Hip flexors: (Modified Walter) L 0/75  TUG: improved from 36-40 sec to  35sec,33 sec,36 sec sec with use of rollator  Thirty sec sit to stand  increased from 6 to 8 (no longer requires UE assist)   Tinetti: gait 7, balance 5 (no change)    Exercises/Interventions: Exercises in bold performed in department today. Items not bolded are carried forward from prior visits for continuity of the record. Exercise/Equipment Resistance/Repetitions HEP Other comments   Hamstring/heel cord stretch manual     bridge 15 []    SLR 15ea     LAQ 3 sec hold x 10 []    HS 15 [] Slide sheet   AS 15 min/mod [] Slide sheet   SAQ 3 sec hold, x 15 []    Standing heel raises 10 []    Standing march 10 []    Standing abduction 10 []    Standing calf stretch on block 30 sec x 2 []    STS from slightly elevated robin table  x10  []    NUSTEP L5  10 min []    Standing Shoulder ext X15, yellow tband []      []    Standing TKE at EOB 10 []     Static balance on Airex 3 min for upright postural control [] B UE assist   Lateral step ups 2\" block x 10 each [] // bars   Step overs 1\" block x 10 ea [] // bars   Alt toe taps on bottom step x10 []      Home Exercise Program:  Access Code: [email or web address]  URL: [email or web address]  Date: 01/31/2023  Prepared by: Hosea Hogan    Exercises  Standing Bilateral Gastroc Stretch with Step - 1 x daily - 7 x weekly - 1 sets - 15 reps  Standing March with Counter Support - 1 x daily - 7 x weekly - 1 sets - 15 reps  Standing Hip Abduction with Counter Support - 1 x daily - 7 x weekly - 1 sets - 15 reps  Heel Raises with Counter Support - 1 x daily - 7 x weekly - 1 sets - 15 reps  Supine Bridge - 1 x daily - 7 x weekly - 1 sets - 15 reps  Supine Heel Slide - 1 x daily - 7 x weekly - 1 sets - 15 reps  Supine Hip Abduction - 1 x daily - 7 x weekly - 1 sets - 15 reps  Small Range Straight Leg Raise - 1 x daily - 7 x weekly - 1 sets - 15 reps  Seated Long Arc Quad - 1 x daily - 7 x weekly - 1 sets - 15 reps        Therapeutic Exercise:   [x] (06500) Provided verbal/tactile cueing for activities related to strengthening, flexibility, endurance, ROM for improvements in LE, proximal hip, and core control with self-care, mobility, lifting, ambulation per list x 21 min    NMR:  [] (08498) Provided verbal/tactile cueing for activities related to improving balance, coordination, kinesthetic sense, posture, motor skill, proprioception to assist with LE, proximal hip, and core control in self-care, mobility, lifting, ambulation and eccentric single leg control. Therapeutic Activities:    [] (21969) Provided verbal/tactile cueing for activities related to improving balance, coordination, kinesthetic sense, posture, motor skill, proprioception and motor activation to allow for proper function of core, proximal hip and LE with self-care and ADLs and functional mobility. Gait Training:    [] (48699) Provided training and instruction to the patient for proper LE, core and proximal hip recruitment and positioning and eccentric body weight control with ambulation re-education including     Manual Treatments:  PROM / STM / Oscillations-Mobs:  G-I, II, III, IV (PA's, Inf., Post.)  [] (63298) Provided manual therapy to mobilize LE, proximal hip and/or LS spine soft tissue/joints for the purpose of modulating pain, promoting relaxation,  increasing ROM, reducing/eliminating soft tissue swelling/inflammation/restriction, improving soft tissue extensibility and allowing for proper ROM for normal function with self-care, mobility, lifting and ambulation.  Per list x 11 min     Home Exercise Program:    [x] (64079) Reviewed/Progressed HEP activities related to strengthening, flexibility, endurance, ROM of core, proximal hip and LE for functional self-care, mobility, lifting and ambulation/stair navigation   [] (38933) Reviewed/Progressed HEP activities related to improving balance, coordination, kinesthetic sense, posture, motor skill, proprioception of core, proximal hip and LE for self-care, mobility, lifting, and ambulation/stair navigation      Modalities:    [] Electric Stimulation:   [] Ultrasound:   [x] Other: 70079 performance testing      Charges:  Timed Code Treatment Minutes: TE x 21, MT x 11, Performance testing x 17   Total Treatment Minutes:  49 min      [] EVAL (LOW) 76676 (typically 20 minutes face-to-face)  [] EVAL (MOD) 97948 (typically 30 minutes face-to-face)  [] EVAL (HIGH) 91742 (typically 45 minutes face-to-face)  [] RE-EVAL     [x] RQ(97360) x  1    [] NMR (82028) x     [x] Manual (07258) x 1      [] TA (78443) x 1      [] Gait Training (23846)    [] ES(attended) (25237)  [] ES (un) (30688)   [] DRY NEEDLE 1 OR 2 MUSCLES  [] DRY NEEDLE 3+ MUSCLES  [] Mech Traction (62815)  [] Ultrasound (07142)  [x] Other: 93381 performance testing      GOALS:   Patient stated goal: \" walk better and be I with shower\"  [] Progressing: [] Met: [] Not Met: [] Adjusted     Therapist goals for Patient:   Short Term Goals: To be achieved in: 4 weeks  1. Pt/cg will demonstrate/verbalize I knowledge of home safety awareness/falls risk prevention with use of AD   [x] Progressing: [] Met: [] Not Met: [] Adjusted  2. Patient will improve LE flexibility throughout hamstrings/heelcords and hip flexors to enable pt to assume upright posture in standing  [x] Progressing: [] Met: [] Not Met: [] Adjusted     Long Term Goals: To be achieved in: 8 weeks  1. Disability index score of 50% on the confidence scale  to assist with reaching prior level of function with decreased fear of falling  [x] Progressing: [] Met: [] Not Met: [] Adjusted  2. Patient will demonstrate improvement in thirty second STS test from 4 to 7    [] Progressing: [x] Met: [] Not Met: [] Adjusted  3. Patient will demonstrate an increase in Strength to 4/5 throughout B LE's for good balance reactions,gait components, and to maintain upright postural control with ambulating functional distances throughout home and community with LRAD  [x] Progressing: [] Met: [] Not Met: [] Adjusted  4.  Patient will improve TUG score from 36 sec to 28 sec to decrease risk of falls  [x] Progressing: [] Met: [] Not Met: [] Adjusted  5. Patient/ to demonstrate I HEP with written instructions     [x] Progressing: [] Met: [] Not Met: [] Adjusted    ASSESSMENT: Pt is  80 Y. O pleasantly confused female referred to PT due to unsteady gait,generalized weakness and decreased postural control with walking. Pt has been seen 16 visits over the past 8 weeks with good tolerance to therapy and is very motivated with participation during session. Pt demonstrates good improvement in hamstring and hip flexor flexibility, improved TRUONG balance score, Improved TUG and improved balance (see measurements above in objective section)  She continues to require constant cueing throughout the visit due to cognitive deficits. Pt will benefit from extending current PT orders with continued progress being seen toward established goals and max potential not yet reached. Plan to  continue PT 2x/wk x 4 more weeks to  promote return to highest level of functional independence and safety with patient/ agreeable      Treatment/Activity Tolerance:  [x] Patient tolerated treatment well [] Patient limited by fatique  [] Patient limited by pain  [] Patient limited by other medical complications  [] Other:     Overall Progression Towards Functional goals/ Treatment Progress Update:  [] Patient is progressing as expected towards functional goals listed. [] Progression is slowed due to complexities/Impairments listed. [x] Progression has been slowed due to co-morbidities. (Cognition)  [] Plan just implemented, too soon to assess goals progression <30days   [] Goals require adjustment due to lack of progress  [] Patient is not progressing as expected and requires additional follow up with physician  [] Other    Prognosis for POC: [x] Good [] Fair  [] Poor    Patient requires continued skilled intervention: [x] Yes  [] No        PLAN: Extend PT 2xwk x 4 more weeks per current POC  Interventions:  [x]  Therapeutic exercise including: strength training, ROM, for Lower extremity and core   [x]  NMR activation and proprioception for LE, Glutes and Core. [x]  Manual therapy as indicated for LE, Hip and spine to include: Dry Needling/IASTM, STM, PROM, Gr I-IV mobilizations, manipulation. [x] Therapeutic activities for LE and core. [x] Gait Training including gait normalization and reducing fall risk. [x] Modalities as needed that may include: thermal agents, E-stim, Biofeedback, US, iontophoresis as indicated  [x] Patient education on joint protection, postural re-education, activity modification, progression of HEP     [x] Continue per plan of care [] Alter current plan (see comments)  [] Plan of care initiated [] Hold pending MD visit [] Discharge    Electronically signed by: Ashlie Javed PT    Note: If patient does not return for scheduled/recommended follow up visits, this note will serve as a discharge from care along with the most recent update on progress.

## 2023-03-19 DIAGNOSIS — R68.89 FORGETFULNESS: ICD-10-CM

## 2023-03-20 ENCOUNTER — HOSPITAL ENCOUNTER (OUTPATIENT)
Dept: PHYSICAL THERAPY | Age: 88
Setting detail: THERAPIES SERIES
Discharge: HOME | End: 2023-03-20
Payer: MEDICARE

## 2023-03-20 PROCEDURE — 97140 MANUAL THERAPY 1/> REGIONS: CPT

## 2023-03-20 PROCEDURE — 97110 THERAPEUTIC EXERCISES: CPT

## 2023-03-20 RX ORDER — DONEPEZIL HYDROCHLORIDE 5 MG/1
TABLET, FILM COATED ORAL
Qty: 90 TABLET | Refills: 2 | Status: SHIPPED | OUTPATIENT
Start: 2023-03-20

## 2023-03-20 NOTE — FLOWSHEET NOTE
[facility] Outpatient Therapy  [address] [place] [address], [address], [address]  Phone: [phone]   Fax: [phone]    Physical Therapy Treatment Note/ Progress Report:     Date:  3/20/2023    Patient Name:  Tavo Gray    :  3/28/1934  MRN: [id number]    Medical/Treatment Diagnosis Information:  Diagnosis: R26.81 unsteady gait  Treatment Diagnosis: R26.81 unsteady gait  Insurance/Certification information:  PT Insurance Information: A[employer]na Medicare  Physician Information:  Derrek Marie MD   Plan of care signed:    [x] Yes  [] No    Date of Patient follow up with Physician:      Progress Report: []  Yes  [x]  No     Date Range for reporting period:  Beginnin23  Ending:  3/17/23 (8 visits)    Progress report due (10 Rx/or 30 days whichever is less):      Recertification due (POC duration/ or 90 days whichever is less):      Visit # Insurance Allowable Auth Needed   ,   []Yes   [x]No     RESTRICTIONS/PRECAUTIONS: high falls risk/osteoporosis  Latex Allergy:  [x]NO      []YES  Preferred Language for Healthcare:   [x]English       []other:    Functional Scale: ABC confidence scale: 10-11% confident  Functional score not completed due to cognitive deficits and  not present, pt took form to return next visit.      Pain level:  0/10     SUBJECTIVE:  offers no c/o's,  reports \" my  said I am walking better\" and I think I am    OBJECTIVE: See eval  Observation: flexed posture,shuffling gait and decreased step height,decreased safe walker distance,poor functional speed,  Test measurements:   Strength: hip flex improved to 3+ B, hip extension remains 3-, abduction improved from 1/5 to 3-/5, knee extension lag remains in standing but able to get full passive knee extension  HS L improved from -45 to L -20,  R HS to -32  Hip flexors: (Modified Walter) L 0/75  TUG: improved from 36-40 sec to  35sec,33 sec,36 sec sec with use of rollator  Thirty sec sit

## 2023-03-30 ENCOUNTER — HOSPITAL ENCOUNTER (OUTPATIENT)
Dept: PHYSICAL THERAPY | Age: 88
Setting detail: THERAPIES SERIES
Discharge: HOME | End: 2023-03-30
Payer: MEDICARE

## 2023-03-30 NOTE — CARE COORDINATION
[facility] Outpatient Therapy  [address] [place] [address], [address]  Phone: [phone]   Fax: [phone]    Physical Therapy Missed Visit Note     Date:  3/30/2023    Patient Name:  Heber Figueroa      :  3/28/1934    MRN: [id number]      Cancelled visits to date: 0  No-shows to date: 0    For today's appointment patient:  []  Cancelled  []  Rescheduled appointment  [x]  No-show     Reason given by patient:  []  Patient ill  []  Conflicting appointment  []  No transportation    []  Conflict with work  []  No reason given  []  Other:     Comments:      Electronically signed by:  Jolie Dozier PT

## 2023-04-10 ENCOUNTER — TELEPHONE (OUTPATIENT)
Dept: INTERNAL MEDICINE CLINIC | Age: 88
End: 2023-04-10

## 2023-04-17 ENCOUNTER — OFFICE VISIT (OUTPATIENT)
Dept: INTERNAL MEDICINE CLINIC | Age: 88
End: 2023-04-17
Payer: MEDICARE

## 2023-04-17 VITALS
SYSTOLIC BLOOD PRESSURE: 140 MMHG | WEIGHT: 143 LBS | TEMPERATURE: 97.7 F | BODY MASS INDEX: 26.31 KG/M2 | DIASTOLIC BLOOD PRESSURE: 93 MMHG | HEIGHT: 62 IN

## 2023-04-17 DIAGNOSIS — J06.9 ACUTE URI: Primary | ICD-10-CM

## 2023-04-17 PROCEDURE — 1123F ACP DISCUSS/DSCN MKR DOCD: CPT | Performed by: HOSPITALIST

## 2023-04-17 PROCEDURE — 99213 OFFICE O/P EST LOW 20 MIN: CPT | Performed by: HOSPITALIST

## 2023-04-17 SDOH — ECONOMIC STABILITY: FOOD INSECURITY: WITHIN THE PAST 12 MONTHS, YOU WORRIED THAT YOUR FOOD WOULD RUN OUT BEFORE YOU GOT MONEY TO BUY MORE.: NEVER TRUE

## 2023-04-17 SDOH — ECONOMIC STABILITY: FOOD INSECURITY: WITHIN THE PAST 12 MONTHS, THE FOOD YOU BOUGHT JUST DIDN'T LAST AND YOU DIDN'T HAVE MONEY TO GET MORE.: NEVER TRUE

## 2023-04-17 SDOH — ECONOMIC STABILITY: INCOME INSECURITY: HOW HARD IS IT FOR YOU TO PAY FOR THE VERY BASICS LIKE FOOD, HOUSING, MEDICAL CARE, AND HEATING?: NOT HARD AT ALL

## 2023-04-17 SDOH — ECONOMIC STABILITY: HOUSING INSECURITY
IN THE LAST 12 MONTHS, WAS THERE A TIME WHEN YOU DID NOT HAVE A STEADY PLACE TO SLEEP OR SLEPT IN A SHELTER (INCLUDING NOW)?: NO

## 2023-04-17 NOTE — PROGRESS NOTES
Cervical: No cervical adenopathy. Skin:     General: Skin is warm and dry. Capillary Refill: Capillary refill takes less than 2 seconds. Findings: No erythema, lesion or rash. Neurological:      General: No focal deficit present. Mental Status: She is alert and oriented to person, place, and time. Cranial Nerves: No cranial nerve deficit. Sensory: No sensory deficit. Gait: Gait normal.      Deep Tendon Reflexes: Reflexes normal.   Psychiatric:         Mood and Affect: Mood normal.       Assessment/ plan:     Leslye Moore was seen today for pharyngitis. Diagnoses and all orders for this visit:    Acute URI  We will proceed with supportive care  Coricidin HBP 4 times a day as needed  Delsym twice a day as needed  Encouraged the patient to stay well-hydrated      Return if symptoms worsen or fail to improve.     Dar Antonio MD

## 2023-04-26 ENCOUNTER — HOSPITAL ENCOUNTER (OUTPATIENT)
Dept: PHYSICAL THERAPY | Age: 88
Setting detail: THERAPIES SERIES
Discharge: HOME | End: 2023-04-26
Payer: MEDICARE

## 2023-04-26 PROCEDURE — 97110 THERAPEUTIC EXERCISES: CPT

## 2023-04-26 NOTE — FLOWSHEET NOTE
[facility] Outpatient Therapy  [address] [place] [address], [address], [address]  Phone: [phone]   Fax: [phone]    Physical Therapy Treatment Note/ Progress Report:     Date:  2023    Patient Name:  Lashell Scherer    :  3/28/1934  MRN: [id number]    Medical/Treatment Diagnosis Information:  Diagnosis: R26.81 unsteady gait  Treatment Diagnosis: R26.81 unsteady gait  Insurance/Certification information:  PT Insurance Information: [facility] [place] [employer] Medicare  Physician Information:  Yesenia Mars MD   Plan of care signed:    [x] Yes  [] No    Date of Patient follow up with Physician:      Progress Report: []  Yes  [x]  No     Date Range for reporting period:  Beginnin23  Endin23 (18 visits)    Progress report due (10 Rx/or 30 days whichever is less): 06     Recertification due (POC duration/ or 90 days whichever is less):      Visit # Insurance Allowable Auth Needed   18,   []Yes   [x]No     RESTRICTIONS/PRECAUTIONS: high falls risk/osteoporosis  Latex Allergy:  [x]NO      []YES  Preferred Language for Healthcare:   [x]English       []other:    Functional Scale: 23 ABC confidence scale: 10-11% confident (completed with assistance of  due to cognitive deficits)  Functional score : 23 ABC confidence: 900/1600 (56% w/ walker)    Pain level:  0/10     SUBJECTIVE: offers no new c/o's, reports feeling of regression following being out of town and not having therapy    OBJECTIVE: See eval  Observation: flexed posture,shuffling gait and decreased step height,decreased safe walker distance,poor functional speed,  Test measurements:   Strength: hip flex 3/5 B, hip extension  3-, abduction 3-/5, knee extension lag remains in standing but able to get full passive knee extension  HS L improved from -45 to L -20,  R HS to -32  Hip flexors: (Modified Walter) L 0/75  TUG: declined from 36-40 sec with rollator to trial#1: 90 sec, trial #2: 48 sec, trial #3 :

## 2023-04-28 ENCOUNTER — HOSPITAL ENCOUNTER (OUTPATIENT)
Dept: PHYSICAL THERAPY | Age: 88
Setting detail: THERAPIES SERIES
Discharge: HOME | End: 2023-04-28
Payer: MEDICARE

## 2023-05-02 ENCOUNTER — HOSPITAL ENCOUNTER (OUTPATIENT)
Dept: PHYSICAL THERAPY | Age: 88
Setting detail: THERAPIES SERIES
Discharge: HOME | End: 2023-05-02
Payer: MEDICARE

## 2023-05-02 PROCEDURE — 97116 GAIT TRAINING THERAPY: CPT

## 2023-05-02 PROCEDURE — 97112 NEUROMUSCULAR REEDUCATION: CPT

## 2023-05-02 PROCEDURE — 97110 THERAPEUTIC EXERCISES: CPT

## 2023-05-02 NOTE — FLOWSHEET NOTE
[facility] Outpatient Therapy  [address] [place] [address],  [address], [address]  Phone: [phone]   Fax: [phone]    Physical Therapy Treatment Note/ Progress Report:     Date:  2023    Patient Name:  Julieth Palmer    :  3/28/1934  MRN: [id number]    Medical/Treatment Diagnosis Information:  Diagnosis: R26.81 unsteady gait  Treatment Diagnosis: R26.81 unsteady gait  Insurance/Certification information:  PT Insurance Information: [facility] [place] [facility] Medicare  Physician Information:  Luisa Jain MD   Plan of care signed:    [x] Yes  [] No    Date of Patient follow up with Physician:      Progress Report: []  Yes  [x]  No     Date Range for reporting period:  Beginnin23  Endin23 (18 visits)    Progress report due (10 Rx/or 30 days whichever is less):      Recertification due (POC duration/ or 90 days whichever is less):      Visit # Insurance Allowable Auth Needed   ,   []Yes   [x]No     RESTRICTIONS/PRECAUTIONS: high falls risk/osteoporosis  Latex Allergy:  [x]NO      []YES  Preferred Language for Healthcare:   [x]English       []other:    Functional Scale: 23 ABC confidence scale: 10-11% confident (completed with assistance of  due to cognitive deficits)  Functional score : 23 ABC confidence: 900/1600 (56% w/ walker)    Pain level:  0/10     SUBJECTIVE: offers no new c/o's, reports feeling great following therapy      OBJECTIVE: See eval  Observation: flexed posture,shuffling gait and decreased step height,decreased safe walker distance,poor functional speed,  Test measurements:   Strength: hip flex 3/5 B, hip extension  3-, abduction 3-/5, knee extension lag remains in standing but able to get full passive knee extension  HS L improved from -45 to L -20,  R HS to -32  Hip flexors: (Modified Walter) L 0/75  TUG: declined from 36-40 sec with rollator to trial#1: 90 sec, trial #2: 48 sec, trial #3 : 75 sec (difficulty due to cognitive

## 2023-05-04 ENCOUNTER — HOSPITAL ENCOUNTER (OUTPATIENT)
Dept: PHYSICAL THERAPY | Age: 88
Setting detail: THERAPIES SERIES
Discharge: HOME | End: 2023-05-04
Payer: MEDICARE

## 2023-05-04 PROCEDURE — 97110 THERAPEUTIC EXERCISES: CPT

## 2023-05-04 PROCEDURE — 97112 NEUROMUSCULAR REEDUCATION: CPT

## 2023-05-04 NOTE — FLOWSHEET NOTE
weeks due to recent decline in order to restore max functional mobility and safety, and cg education  Interventions:  [x]  Therapeutic exercise including: strength training, ROM, for Lower extremity and core   [x]  NMR activation and proprioception for LE, Glutes and Core. [x]  Manual therapy as indicated for LE, Hip and spine to include: Dry Needling/IASTM, STM, PROM, Gr I-IV mobilizations, manipulation. [x] Therapeutic activities for LE and core. [x] Gait Training including gait normalization and reducing fall risk. [x] Modalities as needed that may include: thermal agents, E-stim, Biofeedback, US, iontophoresis as indicated  [x] Patient education on joint protection, postural re-education, activity modification, progression of HEP     [x] Continue per plan of care [] Alter current plan (see comments)  [] Plan of care initiated [] Hold pending MD visit [] Discharge    Electronically signed by: Jane Calzada PT    Note: If patient does not return for scheduled/recommended follow up visits, this note will serve as a discharge from care along with the most recent update on progress.

## 2023-05-09 ENCOUNTER — HOSPITAL ENCOUNTER (OUTPATIENT)
Dept: PHYSICAL THERAPY | Age: 88
Setting detail: THERAPIES SERIES
Discharge: HOME | End: 2023-05-09
Payer: MEDICARE

## 2023-05-09 PROCEDURE — 97116 GAIT TRAINING THERAPY: CPT

## 2023-05-09 PROCEDURE — 97110 THERAPEUTIC EXERCISES: CPT

## 2023-05-09 NOTE — FLOWSHEET NOTE
[facility] Outpatient Therapy  [address] [place] [address], [address]  Phone: [phone]   Fax: [phone]    Physical Therapy Treatment Note/ Progress Report:     Date:  2023    Patient Name:  Kt Conn    :  3/28/1934  MRN: [id number]    Medical/Treatment Diagnosis Information:  Diagnosis: R26.81 unsteady gait  Treatment Diagnosis: R26.81 unsteady gait  Insurance/Certification information:  PT Insurance Information: [employer] Medicare  Physician Information:  Stephanie Chapa MD   Plan of care signed:    [x] Yes  [] No    Date of Patient follow up with Physician:      Progress Report: []  Yes  [x]  No     Date Range for reporting period:  Beginnin23  Endin23 (18 visits)    Progress report due (10 Rx/or 30 days whichever is less): 23     Recertification due (POC duration/ or 90 days whichever is less):      Visit # Insurance Allowable Auth Needed   18,   []Yes   [x]No     RESTRICTIONS/PRECAUTIONS: high falls risk/osteoporosis  Latex Allergy:  [x]NO      []YES  Preferred Language for Healthcare:   [x]English       []other:    Functional Scale: 23 ABC confidence scale: 10-11% confident (completed with assistance of  due to cognitive deficits)  Functional score : 23 ABC confidence: 900/1600 (56% w/ walker)    Pain level:  0/10     SUBJECTIVE: offers no c/o's, \" You almost have to do this forever, because if you stop, you go back to square one\", \"I feel so good after doing therapy and I just love it\"    OBJECTIVE: See eval  Observation: flexed posture,shuffling gait and decreased step height,decreased safe walker distance,poor functional speed,  Test measurements:   Strength: hip flex 3/5 B, hip extension  3-, abduction 3-/5, knee extension lag remains in standing but able to get full passive knee extension  HS L improved from -45 to L -20,  R HS to -32  Hip flexors: (Modified Walter) L 0/75  TUG: declined from 36-40 sec with

## 2023-05-11 ENCOUNTER — HOSPITAL ENCOUNTER (OUTPATIENT)
Dept: PHYSICAL THERAPY | Age: 88
Setting detail: THERAPIES SERIES
Discharge: HOME | End: 2023-05-11
Payer: MEDICARE

## 2023-05-11 PROCEDURE — 97110 THERAPEUTIC EXERCISES: CPT

## 2023-05-11 PROCEDURE — 97116 GAIT TRAINING THERAPY: CPT

## 2023-05-11 NOTE — FLOWSHEET NOTE
[facility] Outpatient Therapy  [address] [place] [address] [address]  Phone: [phone]   Fax: [phone]    Physical Therapy Treatment Note/ Progress Report:     Date:  2023    Patient Name:  Sade Murcia    :  3/28/1934  MRN: [id number]    Medical/Treatment Diagnosis Information:  Diagnosis: R26.81 unsteady gait  Treatment Diagnosis: R26.81 unsteady gait  Insurance/Certification information:  PT Insurance Information: A[employer]na Medicare  Physician Information:  Francine Allison MD   Plan of care signed:    [x] Yes  [] No    Date of Patient follow up with Physician:      Progress Report: []  Yes  [x]  No     Date Range for reporting period:  Beginnin23  Endin23 (18 visits)    Progress report due (10 Rx/or 30 days whichever is less):      Recertification due (POC duration/ or 90 days whichever is less):      Visit # Insurance Allowable Auth Needed   ,   []Yes   [x]No     RESTRICTIONS/PRECAUTIONS: high falls risk/osteoporosis  Latex Allergy:  [x]NO      []YES  Preferred Language for Healthcare:   [x]English       []other:    Functional Scale: 23 ABC confidence scale: 10-11% confident (completed with assistance of  due to cognitive deficits)  Functional score : 23 ABC confidence: 900/1600 (56% w/ walker)    Pain level:  0/10     SUBJECTIVE:  [facility]  Offers no c/o's and really enjoys therapy    OBJECTIVE: See eval  Observation: flexed posture,shuffling gait and decreased step height,decreased safe walker distance,poor functional speed,  Test measurements:   Strength: hip flex 3/5 B, hip extension  3-, abduction 3-/5, knee extension lag remains in standing but able to get full passive knee extension  HS L improved from -45 to L -20,  R HS to -32  Hip flexors: (Modified Walter) L 0/75  TUG: declined from 36-40 sec with rollator to trial#1: 90 sec, trial #2: 48 sec, trial #3 : 75 sec

## 2023-05-16 ENCOUNTER — HOSPITAL ENCOUNTER (OUTPATIENT)
Dept: PHYSICAL THERAPY | Age: 88
Setting detail: THERAPIES SERIES
Discharge: HOME | End: 2023-05-16
Payer: MEDICARE

## 2023-05-16 PROCEDURE — 97110 THERAPEUTIC EXERCISES: CPT

## 2023-05-16 PROCEDURE — 97140 MANUAL THERAPY 1/> REGIONS: CPT

## 2023-05-16 PROCEDURE — 97116 GAIT TRAINING THERAPY: CPT

## 2023-05-16 NOTE — PROGRESS NOTES
[facility] Outpatient Therapy  [address] [place] [address], [address], [address]  Phone: [phone]   Fax: [phone]    Physical Therapy Treatment Note/ Progress Report:     Date:  2023    Patient Name:  Subha Morrison    :  3/28/1934  MRN: [id number]    Medical/Treatment Diagnosis Information:  Diagnosis: R26.81 unsteady gait  Treatment Diagnosis: R26.81 unsteady gait  Insurance/Certification information:  PT Insurance Information: Aetna Medicare  Physician Information:  Cristobal Subramanian MD   Plan of care signed:    [x] Yes  [] No    Date of Patient follow up with Physician:      Progress Report: [x]  Yes  [x]  No     Date Range for reporting period:  Beginnin23  Endin23 (24 visits)    Progress report due (10 Rx/or 30 days whichever is less):      Recertification due (POC duration/ or 90 days whichever is less):      Visit # Insurance Allowable Auth Needed   ,   []Yes   [x]No     RESTRICTIONS/PRECAUTIONS: high falls risk/osteoporosis  Latex Allergy:  [x]NO      []YES  Preferred Language for Healthcare:   [x]English       []other:    Functional Scale: 23 ABC confidence scale: 10-11% confident (completed with assistance of  due to cognitive deficits)  Functional score : 23 ABC confidence: 900/1600 (56% w/ walker)    Pain level:  0/10    SUBJECTIVE:  pt fell coming into therapy and assisted up by  and brought in to therapy in .  Pt declined pain or injury and wants to participate in therapy,  not present at time of fall due to parking car    OBJECTIVE: (Minimal to no change from previous reassessment)  Observation: flexed posture,shuffling gait and decreased step height,decreased safe walker distance,poor functional speed,  Test measurements:   Strength: hip flex 3/5 B, hip extension  3-, abduction 3-/5, knee extension lag remains in standing but able to get full passive knee extension  HS  L -20,  R HS to -32  Hip

## 2023-05-18 ENCOUNTER — HOSPITAL ENCOUNTER (OUTPATIENT)
Dept: PHYSICAL THERAPY | Age: 88
Setting detail: THERAPIES SERIES
Discharge: HOME | End: 2023-05-18
Payer: MEDICARE

## 2023-05-18 PROCEDURE — 97110 THERAPEUTIC EXERCISES: CPT

## 2023-05-18 PROCEDURE — 97116 GAIT TRAINING THERAPY: CPT

## 2023-05-18 NOTE — FLOWSHEET NOTE
[facility] Outpatient Therapy  [address] [place] [address],  [address]  Phone: [phone]   Fax: [phone]    Physical Therapy Treatment Note/ Progress Report:     Date:  2023    Patient Name:  Lorie Leblanc    :  3/28/1934  MRN: [id number]    Medical/Treatment Diagnosis Information:  Diagnosis: R26.81 unsteady gait  Treatment Diagnosis: R26.81 unsteady gait  Insurance/Certification information:  PT Insurance Information: Aetna Medicare  Physician Information:  Rickey Hernandez MD   Plan of care signed:    [x] Yes  [] No    Date of Patient follow up with Physician:      Progress Report: []  Yes  [x]  No     Date Range for reporting period:  Beginnin23  Endin23 (24 visits)    Progress report due (10 Rx/or 30 days whichever is less):      Recertification due (POC duration/ or 90 days whichever is less):      Visit # Insurance Allowable Auth Needed   18,   []Yes   [x]No     RESTRICTIONS/PRECAUTIONS: high falls risk/osteoporosis  Latex Allergy:  [x]NO      []YES  Preferred Language for Healthcare:   [x]English       []other:    Functional Scale: 23 ABC confidence scale: 10-11% confident (completed with assistance of  due to cognitive deficits)  Functional score : 23 ABC confidence: 900/1600 (56% w/ walker)    Pain level:  0/10    SUBJECTIVE:  pt denies pain or soreness from recent fall, pt reports \"this is my fun time, I enjoy it\"    OBJECTIVE:   Observation: flexed posture,shuffling gait and decreased step height,decreased safe walker distance,poor functional speed,  Test measurements:   Strength: hip flex 3/5 B, hip extension  3-, abduction 3-/5, knee extension lag remains in standing but able to get full passive knee extension  HS  L -20,  R HS to -32  Hip flexors: (Modified Walter) L 0/75  TUG: declined from 36-40 sec with rollator to trial#1: 90 sec, trial #2: 48 sec, trial #3 : 75 sec (NT this date due to cognitive

## 2023-05-22 DIAGNOSIS — R68.89 FORGETFULNESS: ICD-10-CM

## 2023-05-22 RX ORDER — DONEPEZIL HYDROCHLORIDE 5 MG/1
5 TABLET, FILM COATED ORAL NIGHTLY
Qty: 90 TABLET | Refills: 1 | Status: SHIPPED | OUTPATIENT
Start: 2023-05-22

## 2023-05-22 NOTE — TELEPHONE ENCOUNTER
Patients  came in requesting a refill on medication.          donepezil (ARICEPT) 5 MG tablet   03/20/23  --  Lanora Kanner, MD    TAKE 1 TABLET BY MOUTH EVERY NIGHT     [facility], [facility] 74 [facility] [phone]

## 2023-05-23 ENCOUNTER — HOSPITAL ENCOUNTER (OUTPATIENT)
Dept: PHYSICAL THERAPY | Age: 88
Setting detail: THERAPIES SERIES
Discharge: HOME | End: 2023-05-23
Payer: MEDICARE

## 2023-05-23 PROCEDURE — 97116 GAIT TRAINING THERAPY: CPT

## 2023-05-23 PROCEDURE — 97110 THERAPEUTIC EXERCISES: CPT

## 2023-05-23 NOTE — FLOWSHEET NOTE
[facility] Outpatient Therapy  [address] [place] [address],  [address], [address]  Phone: [phone]   Fax: [phone]    Physical Therapy Treatment Note/ Progress Report:     Date:  2023    Patient Name:  Erick Chopra    :  3/28/1934  MRN: [id number]    Medical/Treatment Diagnosis Information:  Diagnosis: R26.81 unsteady gait  Treatment Diagnosis: R26.81 unsteady gait  Insurance/Certification information:  PT Insurance Information: Aetna Medicare  Physician Information:  Elsa Infante MD   Plan of care signed:    [x] Yes  [] No    Date of Patient follow up with Physician:      Progress Report: []  Yes  [x]  No     Date Range for reporting period:  Beginnin23  Endin23 (24 visits)    Progress report due (10 Rx/or 30 days whichever is less):      Recertification due (POC duration/ or 90 days whichever is less):      Visit # Insurance Allowable Auth Needed   ,   []Yes   [x]No     RESTRICTIONS/PRECAUTIONS: high falls risk/osteoporosis  Latex Allergy:  [x]NO      []YES  Preferred Language for Healthcare:   [x]English       []other:    Functional Scale: 23 ABC confidence scale: 10-11% confident (completed with assistance of  due to cognitive deficits)  Functional score : 23 ABC confidence: 900/1600 (56% w/ walker)    Pain level:  0/10    SUBJECTIVE:  pt presents with new FWW with  inquiring if it is the correct device.     OBJECTIVE:   Observation: flexed posture,shuffling gait and decreased step height and length,decreased safe walker distance,poor functional speed,  Test measurements:   Strength: hip flex 3/5 B, hip extension  3-, abduction 3-/5, knee extension lag remains in standing but able to get full passive knee extension  HS  L -20,  R HS to -32  Hip flexors: (Modified Walter) L 0/75  TUG: declined from 36-40 sec with rollator to trial#1: 90 sec, trial #2: 48 sec, trial #3 : 75 sec (NT this date due to cognitive

## 2023-05-25 ENCOUNTER — HOSPITAL ENCOUNTER (OUTPATIENT)
Dept: PHYSICAL THERAPY | Age: 88
Setting detail: THERAPIES SERIES
Discharge: HOME | End: 2023-05-25
Payer: MEDICARE

## 2023-05-25 NOTE — CARE COORDINATION
[facility] Outpatient Therapy  [address]  [address] [place] [address] [address]  Phone: [phone]   Fax: [phone]    Physical Therapy Missed Visit Note     Date:  2023    Patient Name:  Anh Johnson      :  3/28/1934    MRN: [id number]      Cancelled visits to date: 0  No-shows to date: 0    For today's appointment patient:  [x]  Cancelled  []  Rescheduled appointment  []  No-show     Reason given by patient:  []  Patient ill  []  Conflicting appointment  [x]  No transportation    []  Conflict with work  []  No reason given  []  Other:     Comments:      Electronically signed by:  Doris Mehta PT

## 2023-05-30 ENCOUNTER — HOSPITAL ENCOUNTER (OUTPATIENT)
Dept: PHYSICAL THERAPY | Age: 88
Setting detail: THERAPIES SERIES
Discharge: HOME | End: 2023-05-30
Payer: MEDICARE

## 2023-05-30 PROCEDURE — 97116 GAIT TRAINING THERAPY: CPT

## 2023-05-30 PROCEDURE — 97110 THERAPEUTIC EXERCISES: CPT

## 2023-05-30 NOTE — FLOWSHEET NOTE
[facility] Outpatient [facility]  [address] [place] [address], [address]  Phone: [phone]   Fax: [phone]    Physical Therapy Treatment Note/ Progress Report:     Date:  2023    Patient Name:  Elmer Parks    :  3/28/1934  MRN: [id number]    Medical/Treatment Diagnosis Information:  Diagnosis: R26.81 unsteady gait  Treatment Diagnosis: R26.81 unsteady gait  Insurance/Certification information:  PT Insurance Information: Aetna Medicare  Physician Information:  Cassie De Los Santos MD   Plan of care signed:    [x] Yes  [] No    Date of Patient follow up with Physician:      Progress Report: []  Yes  [x]  No     Date Range for reporting period:  Beginnin23  Endin23 (24 visits)    Progress report due (10 Rx/or 30 days whichever is less): 33     Recertification due (POC duration/ or 90 days whichever is less):      Visit # Insurance Allowable Auth Needed     []Yes   [x]No     RESTRICTIONS/PRECAUTIONS: high falls risk/osteoporosis  Latex Allergy:  [x]NO      []YES  Preferred Language for Healthcare:   [x]English       []other:    Functional Scale: 23 ABC confidence scale: 10-11% confident (completed with assistance of  due to cognitive deficits)  Functional score : 23 ABC confidence: 900/1600 (56% w/ walker)    Pain level:  L buttock(unable to rate)    SUBJECTIVE:  pt c/o new onset L buttock pain following the recent fall, unable to rate pain but states it is intermittent, is generally mild discomfort but does increase at times, does not require pain to take OTC meds for pain.   accompanied pt in to therapy today to report pt got back legs of FWW in gap at elevator door in the home and pt is now continuing with the rollator    OBJECTIVE:   Observation: flexed posture,shuffling gait and decreased step height and length,decreased safe walker distance,poor functional speed,  Test measurements:   Strength: hip flex 3/5 B, hip

## 2023-06-01 ENCOUNTER — HOSPITAL ENCOUNTER (OUTPATIENT)
Dept: PHYSICAL THERAPY | Age: 88
Setting detail: THERAPIES SERIES
Discharge: HOME | End: 2023-06-01
Payer: MEDICARE

## 2023-06-01 PROCEDURE — 97110 THERAPEUTIC EXERCISES: CPT

## 2023-06-01 PROCEDURE — 97116 GAIT TRAINING THERAPY: CPT

## 2023-06-01 NOTE — FLOWSHEET NOTE
soon to assess goals progression <30days   [] Goals require adjustment due to lack of progress  [] Patient is not progressing as expected and requires additional follow up with physician  [] Other    Prognosis for POC: [] Good [x] Fair  [] Poor    Patient requires continued skilled intervention: [x] Yes  [] No        PLAN:   [x]  Therapeutic exercise including: strength training, ROM, for Lower extremity and core   [x]  NMR activation and proprioception for LE, Glutes and Core. [x]  Manual therapy as indicated for LE, Hip and spine to include: Dry Needling/IASTM, STM, PROM, Gr I-IV mobilizations, manipulation. [x] Therapeutic activities for LE and core. [x] Gait Training including gait normalization and reducing fall risk. [x] Modalities as needed that may include: thermal agents, E-stim, Biofeedback, US, iontophoresis as indicated  [x] Patient education on joint protection, postural re-education, activity modification, progression of HEP     [x] Continue per plan of care [] Alter current plan (see comments)  [] Plan of care initiated [] Hold pending MD visit [] Discharge    Electronically signed by: Minor Stevens PT    Note: If patient does not return for scheduled/recommended follow up visits, this note will serve as a discharge from care along with the most recent update on progress.

## 2023-06-09 ENCOUNTER — HOSPITAL ENCOUNTER (OUTPATIENT)
Dept: PHYSICAL THERAPY | Age: 88
Setting detail: THERAPIES SERIES
Discharge: HOME | End: 2023-06-09
Payer: MEDICARE

## 2023-06-09 PROCEDURE — 97110 THERAPEUTIC EXERCISES: CPT

## 2023-06-09 PROCEDURE — 97750 PHYSICAL PERFORMANCE TEST: CPT

## 2023-06-09 NOTE — THERAPY DISCHARGE
stand currently requiring UE assist with 5 reps( constant and specific cueing due to confusion)  Tinetti: gait 7, balance 5 (initially) not tested today due to cognition    Exercises/Interventions: Exercises in bold performed in department today. Items not bolded are carried forward from prior visits for continuity of the record. Exercise/Equipment Resistance/Repetitions HEP Other comments   Hamstring/heel cord stretch in supine manual     QS in long sitting x15     bridge 3x10 []    SLR 10 ea  Ext lag   LAQ 3 sec hold 2 x 10 []    HS 15 [] Slide sheet   AS 15 [] Slide sheet   SAQ 3 sec hold,2 x 10 []    STS 10 []    Standing march 10ea []    Standing abduction 10 []    Standing calf stretch on block 30 sec x 2 []    Standing heel/toe raises  x10 []    Side stepping in // bars 4 laps []    Forward/backward walking in // bars 3 laps ea     Static standing on Airex 1 min x 2 trials [] Single hand support   NUSTEP L4 x  6 min []    Gt training with rollator Training in step length, step through sequencing,upright posture,safe walker distances,turns and backing up to surface to sit  [] Halima     Upright posture standing in // bars 1 min x 3 [] B UE assist progressing to no hands and CGA   Lateral step ups 2\" block x 10 each [] // bars   Front step ups 4\" block x 10 ea  // bars   Step overs 1\" block x 10 ea [] // bars   Alt toe taps on bottom step x10 [] Single hand support     Home Exercise Program:  Access Code: [email or web address]  URL: [email or web address]  Date: 01/31/2023  Prepared by: Jeancarlos Pham    Exercises  Standing Bilateral Gastroc Stretch with Step - 1 x daily - 7 x weekly - 1 sets - 15 reps  Standing March with Counter Support - 1 x daily - 7 x weekly - 1 sets - 15 reps  Standing Hip Abduction with Counter Support - 1 x daily - 7 x weekly - 1 sets - 15 reps  Heel Raises with Counter Support - 1 x daily - 7 x weekly - 1 sets - 15 reps  Supine Bridge - 1 x daily - 7 x weekly - 1 sets - 15 reps  Supine Heel

## 2023-07-06 ENCOUNTER — OFFICE VISIT (OUTPATIENT)
Dept: INTERNAL MEDICINE CLINIC | Age: 88
End: 2023-07-06

## 2023-07-06 VITALS
TEMPERATURE: 72 F | SYSTOLIC BLOOD PRESSURE: 136 MMHG | WEIGHT: 148 LBS | OXYGEN SATURATION: 96 % | BODY MASS INDEX: 27.07 KG/M2 | DIASTOLIC BLOOD PRESSURE: 80 MMHG

## 2023-07-06 DIAGNOSIS — R68.89 FORGETFULNESS: Primary | ICD-10-CM

## 2023-07-06 DIAGNOSIS — N39.41 URGE INCONTINENCE: ICD-10-CM

## 2023-07-06 DIAGNOSIS — R26.81 UNSTEADY GAIT WHEN WALKING: ICD-10-CM

## 2023-07-06 DIAGNOSIS — E78.5 DYSLIPIDEMIA: ICD-10-CM

## 2023-12-11 DIAGNOSIS — R68.89 FORGETFULNESS: ICD-10-CM

## 2023-12-11 RX ORDER — DONEPEZIL HYDROCHLORIDE 5 MG/1
5 TABLET, FILM COATED ORAL NIGHTLY
Qty: 90 TABLET | Refills: 1 | Status: SHIPPED | OUTPATIENT
Start: 2023-12-11

## 2024-01-09 ENCOUNTER — OFFICE VISIT (OUTPATIENT)
Dept: INTERNAL MEDICINE CLINIC | Age: 89
End: 2024-01-09
Payer: MEDICARE

## 2024-01-09 VITALS
BODY MASS INDEX: 25.95 KG/M2 | WEIGHT: 141 LBS | HEIGHT: 62 IN | SYSTOLIC BLOOD PRESSURE: 121 MMHG | DIASTOLIC BLOOD PRESSURE: 70 MMHG | HEART RATE: 76 BPM

## 2024-01-09 DIAGNOSIS — G30.9 ALZHEIMER'S DISEASE, UNSPECIFIED (CODE) (HCC): ICD-10-CM

## 2024-01-09 DIAGNOSIS — E78.5 DYSLIPIDEMIA: ICD-10-CM

## 2024-01-09 DIAGNOSIS — Z00.00 MEDICARE ANNUAL WELLNESS VISIT, SUBSEQUENT: Primary | ICD-10-CM

## 2024-01-09 DIAGNOSIS — N39.41 URGE INCONTINENCE: ICD-10-CM

## 2024-01-09 DIAGNOSIS — E55.9 VITAMIN D DEFICIENCY: ICD-10-CM

## 2024-01-09 DIAGNOSIS — I35.0 MODERATE AORTIC STENOSIS: ICD-10-CM

## 2024-01-09 PROCEDURE — G0439 PPPS, SUBSEQ VISIT: HCPCS | Performed by: HOSPITALIST

## 2024-01-09 PROCEDURE — 1123F ACP DISCUSS/DSCN MKR DOCD: CPT | Performed by: HOSPITALIST

## 2024-01-09 RX ORDER — MEMANTINE HYDROCHLORIDE 5 MG/1
5 TABLET ORAL 2 TIMES DAILY
Qty: 60 TABLET | Refills: 2 | Status: SHIPPED | OUTPATIENT
Start: 2024-01-09

## 2024-01-09 ASSESSMENT — PATIENT HEALTH QUESTIONNAIRE - PHQ9
SUM OF ALL RESPONSES TO PHQ9 QUESTIONS 1 & 2: 0
SUM OF ALL RESPONSES TO PHQ QUESTIONS 1-9: 0
1. LITTLE INTEREST OR PLEASURE IN DOING THINGS: 0
2. FEELING DOWN, DEPRESSED OR HOPELESS: 0

## 2024-01-09 ASSESSMENT — LIFESTYLE VARIABLES: HOW OFTEN DO YOU HAVE A DRINK CONTAINING ALCOHOL: NEVER

## 2024-01-09 NOTE — PROGRESS NOTES
Medicare Annual Wellness Visit    Neida Dhillon is here for Medicare AWV    Assessment & Plan   Medicare annual wellness visit, subsequent  -     CBC with Auto Differential; Future  -     Comprehensive Metabolic Panel; Future  -     Lipid Panel; Future  -     Encouraged the patient to increase regular exercise activity and make an appointment for an eye exam    Dyslipidemia  -     TSH; Future  -     Lipid Panel; Future  -     Continue to adhere to heart healthy diet    Urge incontinence        -     Continue mirabegron 25 mg daily    Moderate aortic stenosis  -     ECHO Complete 2D W Doppler W Color    Alzheimer's disease, unspecified       -     Continue with donepezil 5 mg nightly  -     memantine (NAMENDA) 5 MG tablet; Take 1 tablet by mouth 2 times daily, Disp-60 tablet, R-2Normal  -     TSH; Future  -      Continue supplementation with vitamin B complex MVI    Vitamin D deficiency         -    Will check vitamin D, 25 OH level         -    Supplement as necessary  Recommendations for Preventive Services Due: see orders and patient instructions/AVS.  Recommended screening schedule for the next 5-10 years is provided to the patient in written form: see Patient Instructions/AVS.     Return in about 6 months (around 7/9/2024) for dyslipidemia, dementia, urge incontinence, moderate AS.     Subjective   Doing well overall.  Ambulates with an assistance of a walker.  Short-term memory is getting progressively worse.  She does not report chest pain/shortness of breath.  No nausea, no vomiting, no diarrhea.  Positive symptoms of overactive bladder which are actually better with the use of mirabegron:    Patient's complete Health Risk Assessment and screening values have been reviewed and are found in Flowsheets. The following problems were reviewed today and where indicated follow up appointments were made and/or referrals ordered.    Positive Risk Factor Screenings with Interventions:    Fall Risk:  Do you feel

## 2024-01-09 NOTE — PATIENT INSTRUCTIONS
low-dose aspirin. Wait for an ambulance. Do not try to drive yourself.  Watch closely for changes in your health, and be sure to contact your doctor if you have any problems.  Where can you learn more?  Go to https://www.[employer].net/patientEd and enter F075 to learn more about \"A Healthy Heart: Care Instructions.\"  Current as of: June 25, 2023               Content Version: 13.9  © 5948-9183 [employer].   Care instructions adapted under license by [employer]. If you have questions about a medical condition or this instruction, always ask your healthcare professional. [employer] disclaims any warranty or liability for your use of this information.      Personalized Preventive Plan for Neida Dhillon - 1/9/2024  Medicare offers a range of preventive health benefits. Some of the tests and screenings are paid in full while other may be subject to a deductible, co-insurance, and/or copay.    Some of these benefits include a comprehensive review of your medical history including lifestyle, illnesses that may run in your family, and various assessments and screenings as appropriate.    After reviewing your medical record and screening and assessments performed today your provider may have ordered immunizations, labs, imaging, and/or referrals for you.  A list of these orders (if applicable) as well as your Preventive Care list are included within your After Visit Summary for your review.    Other Preventive Recommendations:    A preventive eye exam performed by an eye specialist is recommended every 1-2 years to screen for glaucoma; cataracts, macular degeneration, and other eye disorders.  A preventive dental visit is recommended every 6 months.  Try to get at least 150 minutes of exercise per week or 10,000 steps per day on a pedometer .  Order or download the FREE \"Exercise & Physical Activity: Your Everyday Guide\" from [employer]. Call 1-183.756.2265 or search The

## 2024-01-23 DIAGNOSIS — E78.5 DYSLIPIDEMIA: ICD-10-CM

## 2024-01-23 DIAGNOSIS — E55.9 VITAMIN D DEFICIENCY: ICD-10-CM

## 2024-01-23 DIAGNOSIS — G30.9 ALZHEIMER'S DISEASE, UNSPECIFIED (CODE) (HCC): ICD-10-CM

## 2024-01-23 DIAGNOSIS — Z00.00 MEDICARE ANNUAL WELLNESS VISIT, SUBSEQUENT: ICD-10-CM

## 2024-01-23 LAB
25(OH)D3 SERPL-MCNC: 26.4 NG/ML
ALBUMIN SERPL-MCNC: 4.4 G/DL (ref 3.4–5)
ALBUMIN/GLOB SERPL: 2.1 {RATIO} (ref 1.1–2.2)
ALP SERPL-CCNC: 117 U/L (ref 40–129)
ALT SERPL-CCNC: 8 U/L (ref 10–40)
ANION GAP SERPL CALCULATED.3IONS-SCNC: 12 MMOL/L (ref 3–16)
AST SERPL-CCNC: 15 U/L (ref 15–37)
BASOPHILS # BLD: 0 K/UL (ref 0–0.2)
BASOPHILS NFR BLD: 0.2 %
BILIRUB SERPL-MCNC: 0.6 MG/DL (ref 0–1)
BUN SERPL-MCNC: 16 MG/DL (ref 7–20)
CALCIUM SERPL-MCNC: 9.7 MG/DL (ref 8.3–10.6)
CHLORIDE SERPL-SCNC: 105 MMOL/L (ref 99–110)
CHOLEST SERPL-MCNC: 194 MG/DL (ref 0–199)
CO2 SERPL-SCNC: 26 MMOL/L (ref 21–32)
CREAT SERPL-MCNC: 0.7 MG/DL (ref 0.6–1.2)
DEPRECATED RDW RBC AUTO: 13.2 % (ref 12.4–15.4)
EOSINOPHIL # BLD: 0.1 K/UL (ref 0–0.6)
EOSINOPHIL NFR BLD: 1.5 %
GFR SERPLBLD CREATININE-BSD FMLA CKD-EPI: >60 ML/MIN/{1.73_M2}
GLUCOSE SERPL-MCNC: 89 MG/DL (ref 70–99)
HCT VFR BLD AUTO: 41.1 % (ref 36–48)
HDLC SERPL-MCNC: 55 MG/DL (ref 40–60)
HGB BLD-MCNC: 14 G/DL (ref 12–16)
LDLC SERPL CALC-MCNC: 116 MG/DL
LYMPHOCYTES # BLD: 2.1 K/UL (ref 1–5.1)
LYMPHOCYTES NFR BLD: 25.7 %
MCH RBC QN AUTO: 33.1 PG (ref 26–34)
MCHC RBC AUTO-ENTMCNC: 33.9 G/DL (ref 31–36)
MCV RBC AUTO: 97.6 FL (ref 80–100)
MONOCYTES # BLD: 0.6 K/UL (ref 0–1.3)
MONOCYTES NFR BLD: 6.9 %
NEUTROPHILS # BLD: 5.3 K/UL (ref 1.7–7.7)
NEUTROPHILS NFR BLD: 65.7 %
PLATELET # BLD AUTO: 195 K/UL (ref 135–450)
PMV BLD AUTO: 10.4 FL (ref 5–10.5)
POTASSIUM SERPL-SCNC: 4.4 MMOL/L (ref 3.5–5.1)
PROT SERPL-MCNC: 6.5 G/DL (ref 6.4–8.2)
RBC # BLD AUTO: 4.22 M/UL (ref 4–5.2)
SODIUM SERPL-SCNC: 143 MMOL/L (ref 136–145)
TRIGL SERPL-MCNC: 115 MG/DL (ref 0–150)
TSH SERPL DL<=0.005 MIU/L-ACNC: 2.17 UIU/ML (ref 0.27–4.2)
VLDLC SERPL CALC-MCNC: 23 MG/DL
WBC # BLD AUTO: 8.1 K/UL (ref 4–11)

## 2024-02-09 ENCOUNTER — HOSPITAL ENCOUNTER (OUTPATIENT)
Dept: NON INVASIVE DIAGNOSTICS | Age: 89
Discharge: HOME | End: 2024-02-09
Payer: MEDICARE

## 2024-02-09 PROCEDURE — 93306 TTE W/DOPPLER COMPLETE: CPT

## 2024-04-30 DIAGNOSIS — G30.9 ALZHEIMER'S DISEASE, UNSPECIFIED (CODE) (HCC): ICD-10-CM

## 2024-04-30 RX ORDER — MEMANTINE HYDROCHLORIDE 5 MG/1
5 TABLET ORAL 2 TIMES DAILY
Qty: 60 TABLET | Refills: 2 | Status: SHIPPED | OUTPATIENT
Start: 2024-04-30

## 2024-04-30 NOTE — TELEPHONE ENCOUNTER
Amrita from \[facility]\"" pharmacy is requesting a refill on pts medication below.      memantine (NAMENDA) 5 MG tablet [[id number]]     [id number] [place] [id number] - P [phone] - F [phone]

## 2024-06-21 DIAGNOSIS — R68.89 FORGETFULNESS: ICD-10-CM

## 2024-06-21 RX ORDER — DONEPEZIL HYDROCHLORIDE 5 MG/1
5 TABLET, FILM COATED ORAL NIGHTLY
Qty: 90 TABLET | Refills: 1 | Status: SHIPPED | OUTPATIENT
Start: 2024-06-21

## 2024-07-18 ENCOUNTER — OFFICE VISIT (OUTPATIENT)
Dept: INTERNAL MEDICINE CLINIC | Age: 89
End: 2024-07-18

## 2024-07-18 VITALS
WEIGHT: 148 LBS | DIASTOLIC BLOOD PRESSURE: 70 MMHG | BODY MASS INDEX: 27.23 KG/M2 | HEIGHT: 62 IN | SYSTOLIC BLOOD PRESSURE: 120 MMHG

## 2024-07-18 DIAGNOSIS — F02.B0 MODERATE LATE ONSET ALZHEIMER'S DEMENTIA WITHOUT BEHAVIORAL DISTURBANCE, PSYCHOTIC DISTURBANCE, MOOD DISTURBANCE, OR ANXIETY (HCC): ICD-10-CM

## 2024-07-18 DIAGNOSIS — E78.5 DYSLIPIDEMIA: Primary | ICD-10-CM

## 2024-07-18 DIAGNOSIS — I35.0 SEVERE AORTIC STENOSIS: ICD-10-CM

## 2024-07-18 DIAGNOSIS — N39.41 URGE INCONTINENCE: ICD-10-CM

## 2024-07-18 DIAGNOSIS — G30.1 MODERATE LATE ONSET ALZHEIMER'S DEMENTIA WITHOUT BEHAVIORAL DISTURBANCE, PSYCHOTIC DISTURBANCE, MOOD DISTURBANCE, OR ANXIETY (HCC): ICD-10-CM

## 2024-07-18 RX ORDER — MIRABEGRON 50 MG/1
50 TABLET, EXTENDED RELEASE ORAL DAILY
COMMUNITY

## 2024-07-18 NOTE — PROGRESS NOTES
(ARICEPT) 5 MG tablet TAKE 1 TABLET BY MOUTH EVERY NIGHT 90 tablet 1    memantine (NAMENDA) 5 MG tablet Take 1 tablet by mouth 2 times daily 60 tablet 2    vitamin B-12 (CYANOCOBALAMIN) 1000 MCG tablet Take 1 tablet by mouth daily       No current facility-administered medications for this visit.       /70   Ht 1.575 m (5' 2\")   Wt 67.1 kg (148 lb)   BMI 27.07 kg/m²     Physical Exam   Physical Exam  Vitals and nursing note reviewed.   Constitutional:       General: She is not in acute distress.     Appearance: Normal appearance. She is well-developed.   HENT:      Head: Normocephalic and atraumatic.      Right Ear: Tympanic membrane, ear canal and external ear normal. There is no impacted cerumen.      Left Ear: Tympanic membrane, ear canal and external ear normal. There is no impacted cerumen.      Nose:      Comments: Nasal mucosa is moderately swollen and mildly inflamed     Mouth/Throat:      Mouth: Mucous membranes are moist.      Pharynx: Oropharynx is clear. No oropharyngeal exudate or posterior oropharyngeal erythema.   Eyes:      General: No scleral icterus.     Extraocular Movements: Extraocular movements intact.      Conjunctiva/sclera: Conjunctivae normal.      Pupils: Pupils are equal, round, and reactive to light.   Neck:      Vascular: No carotid bruit or JVD.   Cardiovascular:      Rate and Rhythm: Normal rate and regular rhythm.      Heart sounds: Normal heart sounds. No murmur heard.     No friction rub. No gallop.   Pulmonary:      Effort: Pulmonary effort is normal. No respiratory distress.      Breath sounds: Normal breath sounds. No wheezing or rales.   Abdominal:      General: Bowel sounds are normal. There is no distension.      Palpations: Abdomen is soft.      Tenderness: There is no abdominal tenderness. There is no right CVA tenderness or left CVA tenderness.   Musculoskeletal:         General: No tenderness. Normal range of motion.      Cervical back: Normal range of motion and

## 2024-09-03 DIAGNOSIS — G30.9 ALZHEIMER'S DISEASE, UNSPECIFIED (CODE) (HCC): ICD-10-CM

## 2024-09-03 RX ORDER — MEMANTINE HYDROCHLORIDE 5 MG/1
5 TABLET ORAL 2 TIMES DAILY
Qty: 60 TABLET | Refills: 2 | Status: SHIPPED | OUTPATIENT
Start: 2024-09-03

## 2024-10-22 ENCOUNTER — OFFICE VISIT (OUTPATIENT)
Dept: INTERNAL MEDICINE CLINIC | Age: 89
End: 2024-10-22
Payer: MEDICARE

## 2024-10-22 VITALS
WEIGHT: 147 LBS | BODY MASS INDEX: 27.05 KG/M2 | DIASTOLIC BLOOD PRESSURE: 82 MMHG | SYSTOLIC BLOOD PRESSURE: 124 MMHG | HEIGHT: 62 IN

## 2024-10-22 DIAGNOSIS — G30.1 MODERATE LATE ONSET ALZHEIMER'S DEMENTIA WITHOUT BEHAVIORAL DISTURBANCE, PSYCHOTIC DISTURBANCE, MOOD DISTURBANCE, OR ANXIETY (HCC): ICD-10-CM

## 2024-10-22 DIAGNOSIS — Z95.2 S/P TAVR (TRANSCATHETER AORTIC VALVE REPLACEMENT): ICD-10-CM

## 2024-10-22 DIAGNOSIS — E78.5 DYSLIPIDEMIA: Primary | ICD-10-CM

## 2024-10-22 DIAGNOSIS — N39.41 URGE INCONTINENCE: ICD-10-CM

## 2024-10-22 DIAGNOSIS — E55.9 VITAMIN D DEFICIENCY: ICD-10-CM

## 2024-10-22 DIAGNOSIS — F02.B0 MODERATE LATE ONSET ALZHEIMER'S DEMENTIA WITHOUT BEHAVIORAL DISTURBANCE, PSYCHOTIC DISTURBANCE, MOOD DISTURBANCE, OR ANXIETY (HCC): ICD-10-CM

## 2024-10-22 PROCEDURE — 99214 OFFICE O/P EST MOD 30 MIN: CPT | Performed by: HOSPITALIST

## 2024-10-22 PROCEDURE — 1123F ACP DISCUSS/DSCN MKR DOCD: CPT | Performed by: HOSPITALIST

## 2024-10-22 RX ORDER — ASPIRIN 81 MG/1
81 TABLET ORAL DAILY
COMMUNITY

## 2024-10-22 SDOH — ECONOMIC STABILITY: INCOME INSECURITY: HOW HARD IS IT FOR YOU TO PAY FOR THE VERY BASICS LIKE FOOD, HOUSING, MEDICAL CARE, AND HEATING?: NOT HARD AT ALL

## 2024-10-22 SDOH — ECONOMIC STABILITY: FOOD INSECURITY: WITHIN THE PAST 12 MONTHS, THE FOOD YOU BOUGHT JUST DIDN'T LAST AND YOU DIDN'T HAVE MONEY TO GET MORE.: NEVER TRUE

## 2024-10-22 SDOH — ECONOMIC STABILITY: FOOD INSECURITY: WITHIN THE PAST 12 MONTHS, YOU WORRIED THAT YOUR FOOD WOULD RUN OUT BEFORE YOU GOT MONEY TO BUY MORE.: NEVER TRUE

## 2024-10-22 NOTE — PROGRESS NOTES
EVERY NIGHT 90 tablet 1    vitamin B-12 (CYANOCOBALAMIN) 1000 MCG tablet Take 1 tablet by mouth daily       No current facility-administered medications for this visit.       /82   Ht 1.575 m (5' 2\")   Wt 66.7 kg (147 lb)   BMI 26.89 kg/m²     Physical Exam   Physical Exam  Vitals and nursing note reviewed.   Constitutional:       General: She is not in acute distress.     Appearance: Normal appearance. She is well-developed.   HENT:      Head: Normocephalic and atraumatic.      Right Ear: Tympanic membrane, ear canal and external ear normal. There is no impacted cerumen.      Left Ear: Tympanic membrane, ear canal and external ear normal. There is no impacted cerumen.      Nose:      Comments: Nasal mucosa is moderately swollen and mildly inflamed     Mouth/Throat:      Mouth: Mucous membranes are moist.      Pharynx: Oropharynx is clear. No oropharyngeal exudate or posterior oropharyngeal erythema.   Eyes:      General: No scleral icterus.     Pupils: Pupils are equal, round, and reactive to light.   Neck:      Vascular: No carotid bruit or JVD.   Cardiovascular:      Rate and Rhythm: Normal rate and regular rhythm.      Pulses: Normal pulses.      Heart sounds: Normal heart sounds. No murmur heard.     No friction rub. No gallop.   Pulmonary:      Effort: Pulmonary effort is normal. No respiratory distress.      Breath sounds: Normal breath sounds. No wheezing or rales.   Abdominal:      General: Bowel sounds are normal. There is no distension.      Palpations: Abdomen is soft.      Tenderness: There is no abdominal tenderness. There is no right CVA tenderness or left CVA tenderness.   Musculoskeletal:         General: No tenderness. Normal range of motion.      Cervical back: Normal range of motion and neck supple.      Right lower leg: Edema present.      Left lower leg: Edema present.      Comments: 1+ bilateral below the knee lower extremity edema   Lymphadenopathy:      Cervical: No cervical

## 2024-10-29 DIAGNOSIS — G30.1 MODERATE LATE ONSET ALZHEIMER'S DEMENTIA WITHOUT BEHAVIORAL DISTURBANCE, PSYCHOTIC DISTURBANCE, MOOD DISTURBANCE, OR ANXIETY (HCC): ICD-10-CM

## 2024-10-29 DIAGNOSIS — E55.9 VITAMIN D DEFICIENCY: ICD-10-CM

## 2024-10-29 DIAGNOSIS — E78.5 DYSLIPIDEMIA: ICD-10-CM

## 2024-10-29 DIAGNOSIS — F02.B0 MODERATE LATE ONSET ALZHEIMER'S DEMENTIA WITHOUT BEHAVIORAL DISTURBANCE, PSYCHOTIC DISTURBANCE, MOOD DISTURBANCE, OR ANXIETY (HCC): ICD-10-CM

## 2024-10-29 LAB
25(OH)D3 SERPL-MCNC: 26.9 NG/ML
ALBUMIN SERPL-MCNC: 4.3 G/DL (ref 3.4–5)
ALBUMIN/GLOB SERPL: 1.7 {RATIO} (ref 1.1–2.2)
ALP SERPL-CCNC: 129 U/L (ref 40–129)
ALT SERPL-CCNC: 10 U/L (ref 10–40)
ANION GAP SERPL CALCULATED.3IONS-SCNC: 11 MMOL/L (ref 3–16)
AST SERPL-CCNC: 19 U/L (ref 15–37)
BASOPHILS # BLD: 0 K/UL (ref 0–0.2)
BASOPHILS NFR BLD: 0.5 %
BILIRUB SERPL-MCNC: 0.6 MG/DL (ref 0–1)
BUN SERPL-MCNC: 11 MG/DL (ref 7–20)
CALCIUM SERPL-MCNC: 9.3 MG/DL (ref 8.3–10.6)
CHLORIDE SERPL-SCNC: 104 MMOL/L (ref 99–110)
CHOLEST SERPL-MCNC: 167 MG/DL (ref 0–199)
CO2 SERPL-SCNC: 26 MMOL/L (ref 21–32)
CREAT SERPL-MCNC: 0.8 MG/DL (ref 0.6–1.2)
DEPRECATED RDW RBC AUTO: 13.3 % (ref 12.4–15.4)
EOSINOPHIL # BLD: 0.2 K/UL (ref 0–0.6)
EOSINOPHIL NFR BLD: 2.5 %
GFR SERPLBLD CREATININE-BSD FMLA CKD-EPI: 70 ML/MIN/{1.73_M2}
GLUCOSE SERPL-MCNC: 93 MG/DL (ref 70–99)
HCT VFR BLD AUTO: 41.6 % (ref 36–48)
HDLC SERPL-MCNC: 46 MG/DL (ref 40–60)
HGB BLD-MCNC: 14.3 G/DL (ref 12–16)
LDLC SERPL CALC-MCNC: 95 MG/DL
LYMPHOCYTES # BLD: 1.5 K/UL (ref 1–5.1)
LYMPHOCYTES NFR BLD: 22.9 %
MCH RBC QN AUTO: 33.6 PG (ref 26–34)
MCHC RBC AUTO-ENTMCNC: 34.4 G/DL (ref 31–36)
MCV RBC AUTO: 97.6 FL (ref 80–100)
MONOCYTES # BLD: 0.5 K/UL (ref 0–1.3)
MONOCYTES NFR BLD: 6.8 %
NEUTROPHILS # BLD: 4.4 K/UL (ref 1.7–7.7)
NEUTROPHILS NFR BLD: 67.3 %
PLATELET # BLD AUTO: 165 K/UL (ref 135–450)
PMV BLD AUTO: 10.2 FL (ref 5–10.5)
POTASSIUM SERPL-SCNC: 4.2 MMOL/L (ref 3.5–5.1)
PROT SERPL-MCNC: 6.8 G/DL (ref 6.4–8.2)
RBC # BLD AUTO: 4.27 M/UL (ref 4–5.2)
SODIUM SERPL-SCNC: 141 MMOL/L (ref 136–145)
TRIGL SERPL-MCNC: 128 MG/DL (ref 0–150)
TSH SERPL DL<=0.005 MIU/L-ACNC: 2.19 UIU/ML (ref 0.27–4.2)
VLDLC SERPL CALC-MCNC: 26 MG/DL
WBC # BLD AUTO: 6.6 K/UL (ref 4–11)

## 2025-02-11 DIAGNOSIS — G30.9 ALZHEIMER'S DISEASE, UNSPECIFIED (CODE) (HCC): ICD-10-CM

## 2025-02-11 RX ORDER — MEMANTINE HYDROCHLORIDE 5 MG/1
5 TABLET ORAL 2 TIMES DAILY
Qty: 60 TABLET | Refills: 2 | Status: SHIPPED | OUTPATIENT
Start: 2025-02-11

## 2025-02-14 ENCOUNTER — TELEPHONE (OUTPATIENT)
Dept: INTERNAL MEDICINE CLINIC | Age: 89
End: 2025-02-14

## 2025-02-14 NOTE — TELEPHONE ENCOUNTER
Corcidin HBP over the counter  
Pt has had a runny nose and sore throat for 2 days would like appointment or something sent to pharmacy   
This has been delt with Dr. Christie and pt   
97.6

## 2025-04-22 ENCOUNTER — OFFICE VISIT (OUTPATIENT)
Dept: INTERNAL MEDICINE CLINIC | Age: 89
End: 2025-04-22

## 2025-04-22 VITALS
HEIGHT: 62 IN | WEIGHT: 147 LBS | DIASTOLIC BLOOD PRESSURE: 76 MMHG | SYSTOLIC BLOOD PRESSURE: 120 MMHG | BODY MASS INDEX: 27.05 KG/M2

## 2025-04-22 DIAGNOSIS — E78.5 DYSLIPIDEMIA: ICD-10-CM

## 2025-04-22 DIAGNOSIS — N39.41 URGE INCONTINENCE: ICD-10-CM

## 2025-04-22 DIAGNOSIS — E55.9 VITAMIN D DEFICIENCY: ICD-10-CM

## 2025-04-22 DIAGNOSIS — R68.89 FORGETFULNESS: ICD-10-CM

## 2025-04-22 DIAGNOSIS — Z00.00 MEDICARE ANNUAL WELLNESS VISIT, SUBSEQUENT: ICD-10-CM

## 2025-04-22 DIAGNOSIS — G30.1 MODERATE LATE ONSET ALZHEIMER'S DEMENTIA WITHOUT BEHAVIORAL DISTURBANCE, PSYCHOTIC DISTURBANCE, MOOD DISTURBANCE, OR ANXIETY (HCC): ICD-10-CM

## 2025-04-22 DIAGNOSIS — F02.B0 MODERATE LATE ONSET ALZHEIMER'S DEMENTIA WITHOUT BEHAVIORAL DISTURBANCE, PSYCHOTIC DISTURBANCE, MOOD DISTURBANCE, OR ANXIETY (HCC): ICD-10-CM

## 2025-04-22 DIAGNOSIS — Z95.2 S/P TAVR (TRANSCATHETER AORTIC VALVE REPLACEMENT): ICD-10-CM

## 2025-04-22 DIAGNOSIS — Z00.00 MEDICARE ANNUAL WELLNESS VISIT, SUBSEQUENT: Primary | ICD-10-CM

## 2025-04-22 LAB
25(OH)D3 SERPL-MCNC: 29 NG/ML
ALBUMIN SERPL-MCNC: 4.2 G/DL (ref 3.4–5)
ALBUMIN/GLOB SERPL: 1.8 {RATIO} (ref 1.1–2.2)
ALP SERPL-CCNC: 119 U/L (ref 40–129)
ALT SERPL-CCNC: 11 U/L (ref 10–40)
ANION GAP SERPL CALCULATED.3IONS-SCNC: 12 MMOL/L (ref 3–16)
AST SERPL-CCNC: 18 U/L (ref 15–37)
BASOPHILS # BLD: 0.1 K/UL (ref 0–0.2)
BASOPHILS NFR BLD: 1 %
BILIRUB SERPL-MCNC: 0.6 MG/DL (ref 0–1)
BUN SERPL-MCNC: 12 MG/DL (ref 7–20)
CALCIUM SERPL-MCNC: 9.6 MG/DL (ref 8.3–10.6)
CHLORIDE SERPL-SCNC: 105 MMOL/L (ref 99–110)
CHOLEST SERPL-MCNC: 163 MG/DL (ref 0–199)
CO2 SERPL-SCNC: 25 MMOL/L (ref 21–32)
CREAT SERPL-MCNC: 0.7 MG/DL (ref 0.6–1.2)
DEPRECATED RDW RBC AUTO: 13.4 % (ref 12.4–15.4)
EOSINOPHIL # BLD: 0.1 K/UL (ref 0–0.6)
EOSINOPHIL NFR BLD: 1.7 %
GFR SERPLBLD CREATININE-BSD FMLA CKD-EPI: 82 ML/MIN/{1.73_M2}
GLUCOSE SERPL-MCNC: 103 MG/DL (ref 70–99)
HCT VFR BLD AUTO: 41.5 % (ref 36–48)
HDLC SERPL-MCNC: 47 MG/DL (ref 40–60)
HGB BLD-MCNC: 13.8 G/DL (ref 12–16)
LDLC SERPL CALC-MCNC: 90 MG/DL
LYMPHOCYTES # BLD: 1.7 K/UL (ref 1–5.1)
LYMPHOCYTES NFR BLD: 23.7 %
MCH RBC QN AUTO: 32.7 PG (ref 26–34)
MCHC RBC AUTO-ENTMCNC: 33.3 G/DL (ref 31–36)
MCV RBC AUTO: 98.1 FL (ref 80–100)
MONOCYTES # BLD: 0.5 K/UL (ref 0–1.3)
MONOCYTES NFR BLD: 7.1 %
NEUTROPHILS # BLD: 4.8 K/UL (ref 1.7–7.7)
NEUTROPHILS NFR BLD: 66.5 %
PLATELET # BLD AUTO: 148 K/UL (ref 135–450)
PMV BLD AUTO: 10.4 FL (ref 5–10.5)
POTASSIUM SERPL-SCNC: 4.3 MMOL/L (ref 3.5–5.1)
PROT SERPL-MCNC: 6.6 G/DL (ref 6.4–8.2)
RBC # BLD AUTO: 4.23 M/UL (ref 4–5.2)
SODIUM SERPL-SCNC: 142 MMOL/L (ref 136–145)
TRIGL SERPL-MCNC: 131 MG/DL (ref 0–150)
TSH SERPL DL<=0.005 MIU/L-ACNC: 2.1 UIU/ML (ref 0.27–4.2)
VLDLC SERPL CALC-MCNC: 26 MG/DL
WBC # BLD AUTO: 7.1 K/UL (ref 4–11)

## 2025-04-22 RX ORDER — DONEPEZIL HYDROCHLORIDE 5 MG/1
5 TABLET, FILM COATED ORAL NIGHTLY
Qty: 90 TABLET | Refills: 1 | Status: SHIPPED | OUTPATIENT
Start: 2025-04-22

## 2025-04-22 SDOH — ECONOMIC STABILITY: FOOD INSECURITY: WITHIN THE PAST 12 MONTHS, YOU WORRIED THAT YOUR FOOD WOULD RUN OUT BEFORE YOU GOT MONEY TO BUY MORE.: NEVER TRUE

## 2025-04-22 SDOH — ECONOMIC STABILITY: FOOD INSECURITY: WITHIN THE PAST 12 MONTHS, THE FOOD YOU BOUGHT JUST DIDN'T LAST AND YOU DIDN'T HAVE MONEY TO GET MORE.: NEVER TRUE

## 2025-04-22 ASSESSMENT — PATIENT HEALTH QUESTIONNAIRE - PHQ9
SUM OF ALL RESPONSES TO PHQ QUESTIONS 1-9: 0
SUM OF ALL RESPONSES TO PHQ QUESTIONS 1-9: 0
2. FEELING DOWN, DEPRESSED OR HOPELESS: NOT AT ALL
1. LITTLE INTEREST OR PLEASURE IN DOING THINGS: NOT AT ALL
SUM OF ALL RESPONSES TO PHQ QUESTIONS 1-9: 0
SUM OF ALL RESPONSES TO PHQ QUESTIONS 1-9: 0

## 2025-04-22 ASSESSMENT — ENCOUNTER SYMPTOMS
RESPIRATORY NEGATIVE: 1
EYES NEGATIVE: 1
GASTROINTESTINAL NEGATIVE: 1

## 2025-04-22 ASSESSMENT — LIFESTYLE VARIABLES: HOW OFTEN DO YOU HAVE A DRINK CONTAINING ALCOHOL: NEVER

## 2025-04-22 NOTE — PROGRESS NOTES
Medicare Annual Wellness Visit    Neida Dhillon is here for Medicare AWV    Assessment & Plan   Medicare annual wellness visit, subsequent  -     CBC with Auto Differential; Future  -     Comprehensive Metabolic Panel; Future  -     Encouraged the patient to receive updated COVID-19 vaccination  -     Encouraged the patient to have an eye exam; safety tips were provided.    Forgetfulness  -     Restart donepezil (ARICEPT) 5 MG tablet; Take 1 tablet by mouth nightly, Disp-90 tablet, R-1 Normal  -     Continue Namenda 5 mg orally twice a day  -     Continue vitamin B12 1000 mcg daily    Moderate late onset Alzheimer's dementia without behavioral disturbance, psychotic disturbance, mood disturbance, or anxiety (HCC)         -     As above    Dyslipidemia  -     Lipid Panel; Future  -     TSH; Future  -     Continue to adhere to low-fat/low-cholesterol diet    Urge incontinence        -     Supportive care (incontinence garments)        -     Continue Myrbetriq 50 mg daily    Vitamin D deficiency  -     Vitamin D 25 Hydroxy; Future    S/P TAVR (transcatheter aortic valve replacement)        -     Stable; continue aspirin 81 mg daily     Return if symptoms worsen or fail to improve.     Subjective   Short term memory is getting worse.  No recent falls. Doesn't feel depressed.  Symptoms of over-active bladder are partially controlled with use of Myrbetriq.  Will be moving to an assisted living with her , Get, to [place], [place]    Review of Systems   Constitutional: Negative.    HENT: Negative.          Has scheduled dental appointments every 6 months   Eyes: Negative.    Respiratory: Negative.     Cardiovascular:  Positive for leg swelling.   Gastrointestinal: Negative.    Endocrine: Negative.    Genitourinary:  Positive for frequency.        + urge urinary incontinence   Musculoskeletal: Negative.    Skin: Negative.    Neurological: Negative.    Psychiatric/Behavioral: Negative.          Patient's

## 2025-04-23 ENCOUNTER — RESULTS FOLLOW-UP (OUTPATIENT)
Dept: INTERNAL MEDICINE CLINIC | Age: 89
End: 2025-04-23

## 2025-04-25 ENCOUNTER — TELEPHONE (OUTPATIENT)
Dept: INTERNAL MEDICINE CLINIC | Age: 89
End: 2025-04-25

## 2025-08-05 DIAGNOSIS — G30.9 ALZHEIMER'S DISEASE, UNSPECIFIED (CODE) (HCC): ICD-10-CM

## 2025-08-05 RX ORDER — MEMANTINE HYDROCHLORIDE 5 MG/1
5 TABLET ORAL 2 TIMES DAILY
Qty: 60 TABLET | Refills: 2 | Status: SHIPPED | OUTPATIENT
Start: 2025-08-05